# Patient Record
Sex: MALE | Race: ASIAN | Employment: FULL TIME | URBAN - METROPOLITAN AREA
[De-identification: names, ages, dates, MRNs, and addresses within clinical notes are randomized per-mention and may not be internally consistent; named-entity substitution may affect disease eponyms.]

---

## 2017-01-10 RX ORDER — FENOFIBRATE 48 MG/1
48 TABLET, COATED ORAL DAILY
Qty: 90 TABLET | Refills: 0 | Status: SHIPPED | OUTPATIENT
Start: 2017-01-10 | End: 2017-01-23

## 2017-01-10 NOTE — TELEPHONE ENCOUNTER
From: Rory Marie  To: Yady Gee DO  Sent: 1/9/2017 1:27 PM CST  Subject: Medication Renewal Request    Original authorizing provider: DO Jeanne Marquez would like a refill of the following medications:  Fenofibrate 48 MG Oral Tab Jose Lapine

## 2017-01-23 ENCOUNTER — TELEPHONE (OUTPATIENT)
Dept: FAMILY MEDICINE CLINIC | Facility: CLINIC | Age: 57
End: 2017-01-23

## 2017-01-23 RX ORDER — FENOFIBRATE 48 MG/1
48 TABLET, COATED ORAL DAILY
Qty: 90 TABLET | Refills: 0 | Status: SHIPPED | OUTPATIENT
Start: 2017-01-23 | End: 2017-07-31

## 2017-01-23 NOTE — TELEPHONE ENCOUNTER
Rohan Gonsales from [facility] called stated they sent us a couple faxes requesting a new prescription for fenofibrate, they did received one on the 10th already and was shipped to the pt but the package is delayed and they are not sure what happened to

## 2017-02-21 NOTE — TELEPHONE ENCOUNTER
From: Danielle Asif  To: Riana Tinoco DO  Sent: 2/20/2017 10:45 AM CST  Subject: Medication Renewal Request    Original authorizing provider: DO Jeanne Collins would like a refill of the following medications:  MetFORMIN HCl 1000 MG Oral Tab [J

## 2017-04-08 ENCOUNTER — LAB ENCOUNTER (OUTPATIENT)
Dept: LAB | Age: 57
End: 2017-04-08
Attending: FAMILY MEDICINE
Payer: COMMERCIAL

## 2017-04-08 DIAGNOSIS — Z00.00 ROUTINE GENERAL MEDICAL EXAMINATION AT A HEALTH CARE FACILITY: ICD-10-CM

## 2017-04-08 DIAGNOSIS — E11.9 TYPE II OR UNSPECIFIED TYPE DIABETES MELLITUS WITHOUT MENTION OF COMPLICATION, NOT STATED AS UNCONTROLLED: ICD-10-CM

## 2017-04-08 DIAGNOSIS — E11.9 TYPE 2 DIABETES MELLITUS WITHOUT COMPLICATION, WITHOUT LONG-TERM CURRENT USE OF INSULIN (HCC): ICD-10-CM

## 2017-04-08 PROCEDURE — 83036 HEMOGLOBIN GLYCOSYLATED A1C: CPT

## 2017-04-08 PROCEDURE — 36415 COLL VENOUS BLD VENIPUNCTURE: CPT

## 2017-04-08 PROCEDURE — 80061 LIPID PANEL: CPT

## 2017-04-08 PROCEDURE — 84443 ASSAY THYROID STIM HORMONE: CPT

## 2017-04-08 PROCEDURE — 82043 UR ALBUMIN QUANTITATIVE: CPT

## 2017-04-08 PROCEDURE — 85025 COMPLETE CBC W/AUTO DIFF WBC: CPT

## 2017-04-08 PROCEDURE — 82570 ASSAY OF URINE CREATININE: CPT

## 2017-04-08 PROCEDURE — 80053 COMPREHEN METABOLIC PANEL: CPT

## 2017-04-11 ENCOUNTER — TELEPHONE (OUTPATIENT)
Dept: FAMILY MEDICINE CLINIC | Facility: CLINIC | Age: 57
End: 2017-04-11

## 2017-04-17 ENCOUNTER — OFFICE VISIT (OUTPATIENT)
Dept: FAMILY MEDICINE CLINIC | Facility: CLINIC | Age: 57
End: 2017-04-17

## 2017-04-17 VITALS
TEMPERATURE: 98 F | RESPIRATION RATE: 20 BRPM | BODY MASS INDEX: 27 KG/M2 | SYSTOLIC BLOOD PRESSURE: 130 MMHG | WEIGHT: 180 LBS | HEART RATE: 78 BPM | OXYGEN SATURATION: 98 % | DIASTOLIC BLOOD PRESSURE: 82 MMHG

## 2017-04-17 DIAGNOSIS — R07.9 CHEST PAIN, UNSPECIFIED TYPE: Primary | ICD-10-CM

## 2017-04-17 DIAGNOSIS — E11.00 TYPE 2 DIABETES MELLITUS WITH HYPEROSMOLARITY WITHOUT COMA, WITHOUT LONG-TERM CURRENT USE OF INSULIN (HCC): ICD-10-CM

## 2017-04-17 PROCEDURE — 99213 OFFICE O/P EST LOW 20 MIN: CPT | Performed by: FAMILY MEDICINE

## 2017-04-17 NOTE — PROGRESS NOTES
Here with his wife who is also a diabetic review recent blood work. He is also been having some at rest or exertional midepigastric pain with belching is now basically gone. It was not related to exercise.     Vital signs are normal.  All medications were

## 2017-07-31 RX ORDER — FENOFIBRATE 48 MG/1
48 TABLET, COATED ORAL DAILY
Qty: 90 TABLET | Refills: 0 | Status: SHIPPED
Start: 2017-07-31 | End: 2017-11-09

## 2017-07-31 NOTE — TELEPHONE ENCOUNTER
From: Jeanne Uriostegui  Sent: 7/31/2017 9:27 AM CDT  Subject: Medication Renewal Request    Jeanne Arnold would like a refill of the following medications:  Fenofibrate 48 MG Oral Tab Fabiola Fears, DO]    Preferred pharmacy: [facility]

## 2017-11-10 RX ORDER — FENOFIBRATE 48 MG/1
48 TABLET, COATED ORAL DAILY
Qty: 90 TABLET | Refills: 0 | Status: SHIPPED
Start: 2017-11-10 | End: 2017-11-22

## 2017-11-10 NOTE — TELEPHONE ENCOUNTER
From: Jeanne Christie  Sent: 11/9/2017 7:05 AM CST  Subject: Medication Renewal Request    Jeanne Arnold would like a refill of the following medications:     TOR MICROLET LANCETS Does not apply Misc Gabi Deal DOJaylen    Preferred pharmacy: [address] [place] [address]

## 2017-11-10 NOTE — TELEPHONE ENCOUNTER
From: Jeanne Collazo  Sent: 11/9/2017 7:02 AM CST  Subject: Medication Renewal Request    Jeanne Arnold would like a refill of the following medications:     Fenofibrate 48 MG Oral Tab Gene Horvath DO]    Preferred pharmacy: [id number] [address] [facility]

## 2017-11-22 RX ORDER — FENOFIBRATE 48 MG/1
48 TABLET, COATED ORAL DAILY
Qty: 30 TABLET | Refills: 0 | Status: SHIPPED | OUTPATIENT
Start: 2017-11-22 | End: 2017-12-04

## 2017-11-25 ENCOUNTER — LAB ENCOUNTER (OUTPATIENT)
Dept: LAB | Age: 57
End: 2017-11-25
Attending: FAMILY MEDICINE
Payer: COMMERCIAL

## 2017-11-25 DIAGNOSIS — E11.00 TYPE 2 DIABETES MELLITUS WITH HYPEROSMOLARITY WITHOUT COMA, WITHOUT LONG-TERM CURRENT USE OF INSULIN (HCC): ICD-10-CM

## 2017-11-25 DIAGNOSIS — R07.9 CHEST PAIN, UNSPECIFIED TYPE: ICD-10-CM

## 2017-11-25 PROCEDURE — 85025 COMPLETE CBC W/AUTO DIFF WBC: CPT

## 2017-11-25 PROCEDURE — 80053 COMPREHEN METABOLIC PANEL: CPT

## 2017-11-25 PROCEDURE — 84443 ASSAY THYROID STIM HORMONE: CPT

## 2017-11-25 PROCEDURE — 80061 LIPID PANEL: CPT

## 2017-11-25 PROCEDURE — 36415 COLL VENOUS BLD VENIPUNCTURE: CPT

## 2017-11-30 NOTE — TELEPHONE ENCOUNTER
From: Jeanne Eason  Sent: 11/30/2017 7:33 AM CST  Subject: Medication Renewal Request    Jeanne Arnold would like a refill of the following medications:     MetFORMIN HCl 1000 MG Oral Tab Lyndsay Null DO]   Patient Comment: Please fill 3 Months supplies at [facility]-

## 2017-12-04 ENCOUNTER — OFFICE VISIT (OUTPATIENT)
Dept: FAMILY MEDICINE CLINIC | Facility: CLINIC | Age: 57
End: 2017-12-04

## 2017-12-04 VITALS
HEART RATE: 74 BPM | TEMPERATURE: 99 F | DIASTOLIC BLOOD PRESSURE: 80 MMHG | RESPIRATION RATE: 16 BRPM | BODY MASS INDEX: 26.82 KG/M2 | WEIGHT: 179 LBS | HEIGHT: 68.5 IN | SYSTOLIC BLOOD PRESSURE: 120 MMHG

## 2017-12-04 DIAGNOSIS — Z00.00 ROUTINE GENERAL MEDICAL EXAMINATION AT A HEALTH CARE FACILITY: Primary | ICD-10-CM

## 2017-12-04 DIAGNOSIS — E11.9 TYPE 2 DIABETES MELLITUS WITHOUT COMPLICATION, WITHOUT LONG-TERM CURRENT USE OF INSULIN (HCC): ICD-10-CM

## 2017-12-04 PROCEDURE — 99396 PREV VISIT EST AGE 40-64: CPT | Performed by: FAMILY MEDICINE

## 2017-12-04 RX ORDER — FENOFIBRATE 48 MG/1
48 TABLET, COATED ORAL DAILY
Qty: 30 TABLET | Refills: 0 | Status: SHIPPED | OUTPATIENT
Start: 2017-12-04 | End: 2018-02-22

## 2017-12-05 NOTE — PROGRESS NOTES
Here for a physical.Type 2 diabetic with off-and-on control. Primarily on metformin twice a day along with fenofibrate. He is a non-smoker nondrinker. Does not regularly exercise. Most recent hemoglobin A1c was 6.6.   He is behind on colonoscopy he does

## 2018-02-22 RX ORDER — FENOFIBRATE 48 MG/1
48 TABLET, COATED ORAL DAILY
Qty: 30 TABLET | Refills: 0 | Status: SHIPPED
Start: 2018-02-22 | End: 2018-02-27

## 2018-02-22 NOTE — TELEPHONE ENCOUNTER
From: Jeanne Orozco  Sent: 2/22/2018 12:11 PM CST  Subject: Medication Renewal Request    Jeanne Arnold would like a refill of the following medications:     Fenofibrate 48 MG Oral Tab Dilma Ahn DO]    Preferred pharmacy: [id number], [address] [facility] -

## 2018-02-27 RX ORDER — FENOFIBRATE 48 MG/1
48 TABLET, COATED ORAL DAILY
Qty: 90 TABLET | Refills: 0 | Status: SHIPPED | OUTPATIENT
Start: 2018-02-27 | End: 2018-05-30

## 2018-05-31 RX ORDER — FENOFIBRATE 48 MG/1
48 TABLET, COATED ORAL DAILY
Qty: 90 TABLET | Refills: 0 | Status: SHIPPED
Start: 2018-05-31 | End: 2018-09-10

## 2018-05-31 NOTE — TELEPHONE ENCOUNTER
From: Jeanne Pavon  Sent: 5/30/2018 12:23 PM CDT  Subject: Medication Renewal Request    Jeanne Arnold would like a refill of the following medications:     Fenofibrate 48 MG Oral Tab Garret Schilling DO]    Preferred pharmacy: [facility] -

## 2018-06-14 ENCOUNTER — PATIENT OUTREACH (OUTPATIENT)
Dept: FAMILY MEDICINE CLINIC | Facility: CLINIC | Age: 58
End: 2018-06-14

## 2018-08-03 ENCOUNTER — OFFICE VISIT (OUTPATIENT)
Dept: FAMILY MEDICINE CLINIC | Facility: CLINIC | Age: 58
End: 2018-08-03

## 2018-08-03 VITALS
BODY MASS INDEX: 27.12 KG/M2 | HEIGHT: 68.5 IN | OXYGEN SATURATION: 98 % | RESPIRATION RATE: 16 BRPM | SYSTOLIC BLOOD PRESSURE: 116 MMHG | HEART RATE: 84 BPM | DIASTOLIC BLOOD PRESSURE: 86 MMHG | WEIGHT: 181 LBS | TEMPERATURE: 98 F

## 2018-08-03 DIAGNOSIS — Z12.5 ENCOUNTER FOR SCREENING FOR MALIGNANT NEOPLASM OF PROSTATE: ICD-10-CM

## 2018-08-03 DIAGNOSIS — Z13.6 ENCOUNTER FOR LIPID SCREENING FOR CARDIOVASCULAR DISEASE: ICD-10-CM

## 2018-08-03 DIAGNOSIS — R25.1 TREMOR: ICD-10-CM

## 2018-08-03 DIAGNOSIS — Z13.220 ENCOUNTER FOR LIPID SCREENING FOR CARDIOVASCULAR DISEASE: ICD-10-CM

## 2018-08-03 DIAGNOSIS — Z13.29 SCREENING FOR THYROID DISORDER: ICD-10-CM

## 2018-08-03 DIAGNOSIS — R25.1 TREMOR OF BOTH HANDS: Primary | ICD-10-CM

## 2018-08-03 DIAGNOSIS — E55.9 HYPOVITAMINOSIS D: ICD-10-CM

## 2018-08-03 DIAGNOSIS — E11.9 CONTROLLED TYPE 2 DIABETES MELLITUS WITHOUT COMPLICATION, WITHOUT LONG-TERM CURRENT USE OF INSULIN (HCC): ICD-10-CM

## 2018-08-03 PROCEDURE — 99214 OFFICE O/P EST MOD 30 MIN: CPT | Performed by: FAMILY MEDICINE

## 2018-08-03 NOTE — PROGRESS NOTES
Presents today with increasing changes of his bilateral upper extremity tremors. They began well over 10 years ago. He is uncertain about other family members with the same problem. He is right-handed.   The tremors are somewhat coarse in nature they are

## 2018-08-14 NOTE — TELEPHONE ENCOUNTER
From: Jeanne Arnold  Sent: 8/14/2018 7:03 AM CDT  Subject: Medication Renewal Request    Jeanne Arnold would like a refill of the following medications:     MetFORMIN HCl 1000 MG Oral Tab Laureano Gruber DO]   Patient Comment: Please fill 3 months supplies    Prefe

## 2018-08-20 ENCOUNTER — HOSPITAL ENCOUNTER (OUTPATIENT)
Dept: MRI IMAGING | Age: 58
Discharge: HOME | End: 2018-08-20
Attending: FAMILY MEDICINE
Payer: COMMERCIAL

## 2018-08-20 DIAGNOSIS — R25.1 TREMOR: ICD-10-CM

## 2018-08-20 LAB — CREAT SERPL-MCNC: 1.2 MG/DL (ref 0.7–1.3)

## 2018-08-20 PROCEDURE — 82565 ASSAY OF CREATININE: CPT

## 2018-08-20 PROCEDURE — 72156 MRI NECK SPINE W/O & W/DYE: CPT | Performed by: FAMILY MEDICINE

## 2018-08-20 PROCEDURE — A9576 INJ PROHANCE MULTIPACK: HCPCS | Performed by: FAMILY MEDICINE

## 2018-08-20 PROCEDURE — 70553 MRI BRAIN STEM W/O & W/DYE: CPT | Performed by: FAMILY MEDICINE

## 2018-09-01 ENCOUNTER — LAB ENCOUNTER (OUTPATIENT)
Dept: LAB | Age: 58
End: 2018-09-01
Attending: FAMILY MEDICINE
Payer: COMMERCIAL

## 2018-09-01 DIAGNOSIS — Z13.220 ENCOUNTER FOR LIPID SCREENING FOR CARDIOVASCULAR DISEASE: ICD-10-CM

## 2018-09-01 DIAGNOSIS — Z12.5 ENCOUNTER FOR SCREENING FOR MALIGNANT NEOPLASM OF PROSTATE: ICD-10-CM

## 2018-09-01 DIAGNOSIS — E11.9 CONTROLLED TYPE 2 DIABETES MELLITUS WITHOUT COMPLICATION, WITHOUT LONG-TERM CURRENT USE OF INSULIN (HCC): ICD-10-CM

## 2018-09-01 DIAGNOSIS — Z13.29 SCREENING FOR THYROID DISORDER: ICD-10-CM

## 2018-09-01 DIAGNOSIS — E55.9 HYPOVITAMINOSIS D: ICD-10-CM

## 2018-09-01 DIAGNOSIS — Z13.6 ENCOUNTER FOR LIPID SCREENING FOR CARDIOVASCULAR DISEASE: ICD-10-CM

## 2018-09-01 LAB
ALBUMIN SERPL-MCNC: 4.2 G/DL (ref 3.5–4.8)
ALBUMIN/GLOB SERPL: 1.4 {RATIO} (ref 1–2)
ALP LIVER SERPL-CCNC: 78 U/L (ref 45–117)
ALT SERPL-CCNC: 78 U/L (ref 17–63)
ANION GAP SERPL CALC-SCNC: 11 MMOL/L (ref 0–18)
AST SERPL-CCNC: 43 U/L (ref 15–41)
BASOPHILS # BLD AUTO: 0.02 X10(3) UL (ref 0–0.1)
BASOPHILS NFR BLD AUTO: 0.5 %
BILIRUB SERPL-MCNC: 0.5 MG/DL (ref 0.1–2)
BUN BLD-MCNC: 13 MG/DL (ref 8–20)
BUN/CREAT SERPL: 9.9 (ref 10–20)
CALCIUM BLD-MCNC: 9.1 MG/DL (ref 8.3–10.3)
CHLORIDE SERPL-SCNC: 105 MMOL/L (ref 101–111)
CHOLEST SMN-MCNC: 131 MG/DL (ref ?–200)
CO2 SERPL-SCNC: 25 MMOL/L (ref 22–32)
COMPLEXED PSA SERPL-MCNC: 1.91 NG/ML (ref 0.01–4)
CREAT BLD-MCNC: 1.31 MG/DL (ref 0.7–1.3)
CREAT UR-SCNC: 112 MG/DL
EOSINOPHIL # BLD AUTO: 0.11 X10(3) UL (ref 0–0.3)
EOSINOPHIL NFR BLD AUTO: 2.8 %
ERYTHROCYTE [DISTWIDTH] IN BLOOD BY AUTOMATED COUNT: 12.8 % (ref 11.5–16)
EST. AVERAGE GLUCOSE BLD GHB EST-MCNC: 157 MG/DL (ref 68–126)
GLOBULIN PLAS-MCNC: 3.1 G/DL (ref 2.5–4)
GLUCOSE BLD-MCNC: 140 MG/DL (ref 70–99)
HBA1C MFR BLD HPLC: 7.1 % (ref ?–5.7)
HCT VFR BLD AUTO: 44.2 % (ref 37–53)
HDLC SERPL-MCNC: 38 MG/DL (ref 40–59)
HGB BLD-MCNC: 13.7 G/DL (ref 13–17)
IMMATURE GRANULOCYTE COUNT: 0.01 X10(3) UL (ref 0–1)
IMMATURE GRANULOCYTE RATIO %: 0.3 %
LDLC SERPL CALC-MCNC: 67 MG/DL (ref ?–100)
LYMPHOCYTES # BLD AUTO: 1.36 X10(3) UL (ref 0.9–4)
LYMPHOCYTES NFR BLD AUTO: 35 %
M PROTEIN MFR SERPL ELPH: 7.3 G/DL (ref 6.1–8.3)
MCH RBC QN AUTO: 28 PG (ref 27–33.2)
MCHC RBC AUTO-ENTMCNC: 31 G/DL (ref 31–37)
MCV RBC AUTO: 90.4 FL (ref 80–99)
MICROALBUMIN UR-MCNC: 0.77 MG/DL
MICROALBUMIN/CREAT 24H UR-RTO: 6.9 UG/MG (ref ?–30)
MONOCYTES # BLD AUTO: 0.39 X10(3) UL (ref 0.1–1)
MONOCYTES NFR BLD AUTO: 10 %
NEUTROPHIL ABS PRELIM: 2 X10 (3) UL (ref 1.3–6.7)
NEUTROPHILS # BLD AUTO: 2 X10(3) UL (ref 1.3–6.7)
NEUTROPHILS NFR BLD AUTO: 51.4 %
NONHDLC SERPL-MCNC: 93 MG/DL (ref ?–130)
OSMOLALITY SERPL CALC.SUM OF ELEC: 294 MOSM/KG (ref 275–295)
PLATELET # BLD AUTO: 255 10(3)UL (ref 150–450)
POTASSIUM SERPL-SCNC: 4.6 MMOL/L (ref 3.6–5.1)
RBC # BLD AUTO: 4.89 X10(6)UL (ref 4.3–5.7)
RED CELL DISTRIBUTION WIDTH-SD: 42.2 FL (ref 35.1–46.3)
SODIUM SERPL-SCNC: 141 MMOL/L (ref 136–144)
TRIGL SERPL-MCNC: 130 MG/DL (ref 30–149)
TSI SER-ACNC: 1.14 MIU/ML (ref 0.35–5.5)
VIT D+METAB SERPL-MCNC: 33.4 NG/ML (ref 30–100)
VLDLC SERPL CALC-MCNC: 26 MG/DL (ref 0–30)
WBC # BLD AUTO: 3.9 X10(3) UL (ref 4–13)

## 2018-09-01 PROCEDURE — 82570 ASSAY OF URINE CREATININE: CPT

## 2018-09-01 PROCEDURE — 82043 UR ALBUMIN QUANTITATIVE: CPT

## 2018-09-01 PROCEDURE — 83036 HEMOGLOBIN GLYCOSYLATED A1C: CPT

## 2018-09-01 PROCEDURE — 80061 LIPID PANEL: CPT

## 2018-09-01 PROCEDURE — 82306 VITAMIN D 25 HYDROXY: CPT

## 2018-09-01 PROCEDURE — 80053 COMPREHEN METABOLIC PANEL: CPT

## 2018-09-01 PROCEDURE — 36415 COLL VENOUS BLD VENIPUNCTURE: CPT

## 2018-09-01 PROCEDURE — 84443 ASSAY THYROID STIM HORMONE: CPT

## 2018-09-01 PROCEDURE — 85025 COMPLETE CBC W/AUTO DIFF WBC: CPT

## 2018-09-10 RX ORDER — FENOFIBRATE 48 MG/1
48 TABLET, COATED ORAL DAILY
Qty: 90 TABLET | Refills: 0 | Status: SHIPPED
Start: 2018-09-10 | End: 2018-12-11

## 2018-09-11 ENCOUNTER — OFFICE VISIT (OUTPATIENT)
Dept: FAMILY MEDICINE CLINIC | Facility: CLINIC | Age: 58
End: 2018-09-11

## 2018-09-11 VITALS
SYSTOLIC BLOOD PRESSURE: 112 MMHG | OXYGEN SATURATION: 98 % | DIASTOLIC BLOOD PRESSURE: 74 MMHG | TEMPERATURE: 98 F | WEIGHT: 181 LBS | RESPIRATION RATE: 17 BRPM | HEIGHT: 68.5 IN | BODY MASS INDEX: 27.12 KG/M2 | HEART RATE: 66 BPM

## 2018-09-11 DIAGNOSIS — E11.9 CONTROLLED TYPE 2 DIABETES MELLITUS WITHOUT COMPLICATION, WITHOUT LONG-TERM CURRENT USE OF INSULIN (HCC): Primary | ICD-10-CM

## 2018-09-11 PROCEDURE — 99213 OFFICE O/P EST LOW 20 MIN: CPT | Performed by: FAMILY MEDICINE

## 2018-09-11 NOTE — PROGRESS NOTES
Still having tremors in the hands and some paresthesias the MR scan confirmed discogenic sources in the cervical spine    We reviewed his blood work. His A1c level is 7.1. He still has very mild elevation of his AST and ALT compatible with fatty liver.

## 2018-09-25 ENCOUNTER — DOCUMENTATION ONLY (OUTPATIENT)
Dept: FAMILY MEDICINE CLINIC | Facility: CLINIC | Age: 58
End: 2018-09-25

## 2018-09-27 ENCOUNTER — TELEPHONE (OUTPATIENT)
Dept: FAMILY MEDICINE CLINIC | Facility: CLINIC | Age: 58
End: 2018-09-27

## 2018-11-17 ENCOUNTER — OFFICE VISIT (OUTPATIENT)
Dept: ENDOCRINOLOGY CLINIC | Facility: CLINIC | Age: 58
End: 2018-11-17

## 2018-11-17 VITALS
WEIGHT: 180 LBS | OXYGEN SATURATION: 99 % | SYSTOLIC BLOOD PRESSURE: 118 MMHG | RESPIRATION RATE: 16 BRPM | DIASTOLIC BLOOD PRESSURE: 70 MMHG | HEART RATE: 87 BPM | BODY MASS INDEX: 26.97 KG/M2 | HEIGHT: 68.5 IN

## 2018-11-17 DIAGNOSIS — E11.9 TYPE 2 DIABETES MELLITUS WITHOUT COMPLICATION, UNSPECIFIED WHETHER LONG TERM INSULIN USE (HCC): Primary | ICD-10-CM

## 2018-11-17 PROCEDURE — 99214 OFFICE O/P EST MOD 30 MIN: CPT | Performed by: NURSE PRACTITIONER

## 2018-11-17 NOTE — PATIENT INSTRUCTIONS
We are here to support you with Diabetes but please remember that you still need your primary care doctor for your routine health maintenance. Your A1C: 7.1%  This test provides us with your average blood sugar for the past 3 months.    The main goal of d is important to monitor your kidney function (blood and urine protein levels) , liver function tests and cholesterol levels when you have diabetes. If your primary care provider has not ordered these tests, I will order them for you.    2. FOOT exams:  dona

## 2018-11-17 NOTE — PROGRESS NOTES
HPI:   Jaimie Good is a 63year old male who presents for recheck of his type 2 diabetes. Patient states that he didn't check his blood glucose regularly until the last couple of days and has been checking in the morning fasting.   He denies missing medication wrist, unspecified site. His family history includes Heart Attack in his father. He  reports that  has never smoked. he has never used smokeless tobacco. He reports that he drinks alcohol. He reports that he does not use drugs.      He has No Known Ranulfo denies abdominal pain and denies heartburn  NEURO: denies headaches and denies numbness and tingling to extremities, positive for bilateral hand tremors    EXAM:   /70   Pulse 87   Resp 16   Ht 68.5\"   Wt 180 lb   SpO2 99%   BMI 26.97 kg/m²  Estimat monitoring blood glucose and the importance of monitoring. Patient agreed to monitoring bid - fasting in AM and 2 hours postprandial of one meal per day.     Patient agreed to increase self monitoring blood glucose but declined adding medications to regime

## 2018-11-24 RX ORDER — BLOOD SUGAR DIAGNOSTIC
STRIP MISCELLANEOUS
Qty: 100 STRIP | Refills: 0 | Status: SHIPPED | OUTPATIENT
Start: 2018-11-24 | End: 2019-03-11

## 2018-12-11 RX ORDER — FENOFIBRATE 48 MG/1
TABLET, COATED ORAL
Qty: 90 TABLET | Refills: 0 | Status: SHIPPED | OUTPATIENT
Start: 2018-12-11 | End: 2018-12-20

## 2018-12-20 RX ORDER — FENOFIBRATE 48 MG/1
48 TABLET, COATED ORAL
Qty: 90 TABLET | Refills: 0 | Status: SHIPPED | OUTPATIENT
Start: 2018-12-20 | End: 2019-03-11

## 2019-03-11 RX ORDER — BLOOD-GLUCOSE METER
1 EACH MISCELLANEOUS 2 TIMES DAILY
Qty: 1 KIT | Refills: 0 | Status: SHIPPED | OUTPATIENT
Start: 2019-03-11 | End: 2020-03-10

## 2019-03-11 RX ORDER — LANCETS
EACH MISCELLANEOUS
Qty: 100 EACH | Refills: 3 | Status: SHIPPED | OUTPATIENT
Start: 2019-03-11

## 2019-03-11 RX ORDER — BLOOD SUGAR DIAGNOSTIC
STRIP MISCELLANEOUS
Qty: 100 STRIP | Refills: 3 | Status: SHIPPED | OUTPATIENT
Start: 2019-03-11 | End: 2020-03-10

## 2019-03-11 RX ORDER — FENOFIBRATE 48 MG/1
48 TABLET, COATED ORAL
Qty: 90 TABLET | Refills: 3 | Status: SHIPPED | OUTPATIENT
Start: 2019-03-11 | End: 2020-02-04

## 2019-08-09 ENCOUNTER — TELEPHONE (OUTPATIENT)
Dept: FAMILY MEDICINE CLINIC | Facility: CLINIC | Age: 59
End: 2019-08-09

## 2019-08-09 NOTE — TELEPHONE ENCOUNTER
.Pt is due for a f/u on blood pressure medication.  Please call to schedule one    No further refills will be made without an appointment

## 2019-08-12 ENCOUNTER — OFFICE VISIT (OUTPATIENT)
Dept: FAMILY MEDICINE CLINIC | Facility: CLINIC | Age: 59
End: 2019-08-12

## 2019-08-12 VITALS
RESPIRATION RATE: 16 BRPM | HEART RATE: 62 BPM | DIASTOLIC BLOOD PRESSURE: 82 MMHG | HEIGHT: 68.5 IN | TEMPERATURE: 99 F | SYSTOLIC BLOOD PRESSURE: 118 MMHG | OXYGEN SATURATION: 98 % | BODY MASS INDEX: 26.82 KG/M2 | WEIGHT: 179 LBS

## 2019-08-12 DIAGNOSIS — Z12.11 SCREENING FOR COLON CANCER: Primary | ICD-10-CM

## 2019-08-12 DIAGNOSIS — E11.9 TYPE 2 DIABETES MELLITUS WITHOUT COMPLICATION, UNSPECIFIED WHETHER LONG TERM INSULIN USE (HCC): ICD-10-CM

## 2019-08-12 PROCEDURE — 99214 OFFICE O/P EST MOD 30 MIN: CPT | Performed by: FAMILY MEDICINE

## 2019-08-16 NOTE — PROGRESS NOTES
2 diabetic with overall lackluster control. Here to review blood work. Today's exam vital signs unremarkable    Diabetic foot exam unremarkable.     The assessment type 2 diabetes inadequately controlled    The plan diet exercise lifestyle update blood wo

## 2019-09-21 ENCOUNTER — LAB ENCOUNTER (OUTPATIENT)
Dept: LAB | Age: 59
End: 2019-09-21
Attending: FAMILY MEDICINE
Payer: COMMERCIAL

## 2019-09-21 DIAGNOSIS — E11.9 TYPE 2 DIABETES MELLITUS WITHOUT COMPLICATION, UNSPECIFIED WHETHER LONG TERM INSULIN USE (HCC): ICD-10-CM

## 2019-09-21 LAB
ALBUMIN SERPL-MCNC: 4.2 G/DL (ref 3.4–5)
ALBUMIN/GLOB SERPL: 1.3 {RATIO} (ref 1–2)
ALP LIVER SERPL-CCNC: 79 U/L (ref 45–117)
ALT SERPL-CCNC: 75 U/L (ref 16–61)
ANION GAP SERPL CALC-SCNC: 5 MMOL/L (ref 0–18)
AST SERPL-CCNC: 42 U/L (ref 15–37)
BASOPHILS # BLD AUTO: 0.01 X10(3) UL (ref 0–0.2)
BASOPHILS NFR BLD AUTO: 0.3 %
BILIRUB SERPL-MCNC: 0.7 MG/DL (ref 0.1–2)
BUN BLD-MCNC: 16 MG/DL (ref 7–18)
BUN/CREAT SERPL: 11.1 (ref 10–20)
CALCIUM BLD-MCNC: 9.2 MG/DL (ref 8.5–10.1)
CHLORIDE SERPL-SCNC: 107 MMOL/L (ref 98–112)
CHOLEST SMN-MCNC: 149 MG/DL (ref ?–200)
CO2 SERPL-SCNC: 29 MMOL/L (ref 21–32)
COMPLEXED PSA SERPL-MCNC: 1.86 NG/ML (ref ?–4)
CREAT BLD-MCNC: 1.44 MG/DL (ref 0.7–1.3)
DEPRECATED RDW RBC AUTO: 42.5 FL (ref 35.1–46.3)
EOSINOPHIL # BLD AUTO: 0.17 X10(3) UL (ref 0–0.7)
EOSINOPHIL NFR BLD AUTO: 4.6 %
ERYTHROCYTE [DISTWIDTH] IN BLOOD BY AUTOMATED COUNT: 13.1 % (ref 11–15)
EST. AVERAGE GLUCOSE BLD GHB EST-MCNC: 160 MG/DL (ref 68–126)
GLOBULIN PLAS-MCNC: 3.3 G/DL (ref 2.8–4.4)
GLUCOSE BLD-MCNC: 153 MG/DL (ref 70–99)
HBA1C MFR BLD HPLC: 7.2 % (ref ?–5.7)
HCT VFR BLD AUTO: 41.1 % (ref 39–53)
HDLC SERPL-MCNC: 37 MG/DL (ref 40–59)
HGB BLD-MCNC: 13.7 G/DL (ref 13–17.5)
IMM GRANULOCYTES # BLD AUTO: 0.01 X10(3) UL (ref 0–1)
IMM GRANULOCYTES NFR BLD: 0.3 %
LDLC SERPL CALC-MCNC: 95 MG/DL (ref ?–100)
LYMPHOCYTES # BLD AUTO: 1.11 X10(3) UL (ref 1–4)
LYMPHOCYTES NFR BLD AUTO: 30 %
M PROTEIN MFR SERPL ELPH: 7.5 G/DL (ref 6.4–8.2)
MCH RBC QN AUTO: 29.8 PG (ref 26–34)
MCHC RBC AUTO-ENTMCNC: 33.3 G/DL (ref 31–37)
MCV RBC AUTO: 89.3 FL (ref 80–100)
MONOCYTES # BLD AUTO: 0.34 X10(3) UL (ref 0.1–1)
MONOCYTES NFR BLD AUTO: 9.2 %
NEUTROPHILS # BLD AUTO: 2.06 X10 (3) UL (ref 1.5–7.7)
NEUTROPHILS # BLD AUTO: 2.06 X10(3) UL (ref 1.5–7.7)
NEUTROPHILS NFR BLD AUTO: 55.6 %
NONHDLC SERPL-MCNC: 112 MG/DL (ref ?–130)
OSMOLALITY SERPL CALC.SUM OF ELEC: 296 MOSM/KG (ref 275–295)
PLATELET # BLD AUTO: 229 10(3)UL (ref 150–450)
POTASSIUM SERPL-SCNC: 4.6 MMOL/L (ref 3.5–5.1)
RBC # BLD AUTO: 4.6 X10(6)UL (ref 4.3–5.7)
SODIUM SERPL-SCNC: 141 MMOL/L (ref 136–145)
TRIGL SERPL-MCNC: 85 MG/DL (ref 30–149)
TSI SER-ACNC: 0.94 MIU/ML (ref 0.36–3.74)
VIT D+METAB SERPL-MCNC: 43.2 NG/ML (ref 30–100)
VLDLC SERPL CALC-MCNC: 17 MG/DL (ref 0–30)
WBC # BLD AUTO: 3.7 X10(3) UL (ref 4–11)

## 2019-09-21 PROCEDURE — 82306 VITAMIN D 25 HYDROXY: CPT

## 2019-09-21 PROCEDURE — 80053 COMPREHEN METABOLIC PANEL: CPT

## 2019-09-21 PROCEDURE — 83036 HEMOGLOBIN GLYCOSYLATED A1C: CPT

## 2019-09-21 PROCEDURE — 36415 COLL VENOUS BLD VENIPUNCTURE: CPT

## 2019-09-21 PROCEDURE — 85025 COMPLETE CBC W/AUTO DIFF WBC: CPT

## 2019-09-21 PROCEDURE — 84443 ASSAY THYROID STIM HORMONE: CPT

## 2019-09-21 PROCEDURE — 80061 LIPID PANEL: CPT

## 2019-09-30 ENCOUNTER — TELEPHONE (OUTPATIENT)
Dept: FAMILY MEDICINE CLINIC | Facility: CLINIC | Age: 59
End: 2019-09-30

## 2019-11-03 DIAGNOSIS — E11.9 TYPE 2 DIABETES MELLITUS WITHOUT COMPLICATION, WITHOUT LONG-TERM CURRENT USE OF INSULIN (HCC): Primary | ICD-10-CM

## 2019-11-30 ENCOUNTER — OFFICE VISIT (OUTPATIENT)
Dept: FAMILY MEDICINE CLINIC | Facility: CLINIC | Age: 59
End: 2019-11-30

## 2019-11-30 VITALS
DIASTOLIC BLOOD PRESSURE: 74 MMHG | BODY MASS INDEX: 26.22 KG/M2 | OXYGEN SATURATION: 98 % | TEMPERATURE: 98 F | HEIGHT: 68.5 IN | SYSTOLIC BLOOD PRESSURE: 126 MMHG | HEART RATE: 66 BPM | RESPIRATION RATE: 18 BRPM | WEIGHT: 175 LBS

## 2019-11-30 DIAGNOSIS — E11.9 DIABETES MELLITUS WITHOUT COMPLICATION (HCC): ICD-10-CM

## 2019-11-30 DIAGNOSIS — Z00.00 WELL ADULT EXAM: Primary | ICD-10-CM

## 2019-11-30 PROCEDURE — 99396 PREV VISIT EST AGE 40-64: CPT | Performed by: FAMILY MEDICINE

## 2019-11-30 NOTE — PROGRESS NOTES
Patient is a type II diabetic here for follow-up he is without symptoms a full diabetic foot exam was performed. Bilateral barefoot skin diabetic exam is normal, visualized feet and the appearance is normal.  Bilateral monofilament/sensation of both feet is

## 2020-02-04 RX ORDER — FENOFIBRATE 48 MG/1
TABLET, COATED ORAL
Qty: 90 TABLET | Refills: 3 | Status: SHIPPED | OUTPATIENT
Start: 2020-02-04 | End: 2021-03-01

## 2020-12-24 ENCOUNTER — LAB ENCOUNTER (OUTPATIENT)
Dept: LAB | Age: 60
End: 2020-12-24
Attending: FAMILY MEDICINE
Payer: COMMERCIAL

## 2020-12-24 DIAGNOSIS — E55.9 HYPOVITAMINOSIS D: ICD-10-CM

## 2020-12-24 DIAGNOSIS — E11.9 TYPE 2 DIABETES MELLITUS WITHOUT COMPLICATION, WITHOUT LONG-TERM CURRENT USE OF INSULIN (HCC): ICD-10-CM

## 2020-12-24 DIAGNOSIS — Z12.5 SCREENING FOR MALIGNANT NEOPLASM OF PROSTATE: ICD-10-CM

## 2020-12-24 DIAGNOSIS — Z00.00 LABORATORY EXAM ORDERED AS PART OF ROUTINE GENERAL MEDICAL EXAMINATION: ICD-10-CM

## 2020-12-24 PROCEDURE — 84443 ASSAY THYROID STIM HORMONE: CPT

## 2020-12-24 PROCEDURE — 80061 LIPID PANEL: CPT

## 2020-12-24 PROCEDURE — 80053 COMPREHEN METABOLIC PANEL: CPT

## 2020-12-24 PROCEDURE — 82043 UR ALBUMIN QUANTITATIVE: CPT

## 2020-12-24 PROCEDURE — 83036 HEMOGLOBIN GLYCOSYLATED A1C: CPT

## 2020-12-24 PROCEDURE — 82570 ASSAY OF URINE CREATININE: CPT

## 2020-12-24 PROCEDURE — 82306 VITAMIN D 25 HYDROXY: CPT

## 2020-12-24 PROCEDURE — 36415 COLL VENOUS BLD VENIPUNCTURE: CPT

## 2020-12-24 PROCEDURE — 85025 COMPLETE CBC W/AUTO DIFF WBC: CPT

## 2020-12-31 ENCOUNTER — OFFICE VISIT (OUTPATIENT)
Dept: FAMILY MEDICINE CLINIC | Facility: CLINIC | Age: 60
End: 2020-12-31

## 2020-12-31 VITALS
HEIGHT: 68.5 IN | HEART RATE: 78 BPM | SYSTOLIC BLOOD PRESSURE: 118 MMHG | BODY MASS INDEX: 25.47 KG/M2 | RESPIRATION RATE: 16 BRPM | WEIGHT: 170 LBS | OXYGEN SATURATION: 98 % | DIASTOLIC BLOOD PRESSURE: 82 MMHG

## 2020-12-31 DIAGNOSIS — E11.9 TYPE 2 DIABETES MELLITUS WITHOUT COMPLICATION, WITHOUT LONG-TERM CURRENT USE OF INSULIN (HCC): ICD-10-CM

## 2020-12-31 DIAGNOSIS — Z00.00 WELL ADULT EXAM: Primary | ICD-10-CM

## 2020-12-31 DIAGNOSIS — Z86.010 HX OF COLONIC POLYPS: ICD-10-CM

## 2020-12-31 DIAGNOSIS — Z12.11 ENCOUNTER FOR SCREENING COLONOSCOPY: ICD-10-CM

## 2020-12-31 DIAGNOSIS — H93.13 TINNITUS OF BOTH EARS: ICD-10-CM

## 2020-12-31 DIAGNOSIS — E78.1 HYPERTRIGLYCERIDEMIA: ICD-10-CM

## 2020-12-31 PROCEDURE — 3079F DIAST BP 80-89 MM HG: CPT | Performed by: FAMILY MEDICINE

## 2020-12-31 PROCEDURE — 3074F SYST BP LT 130 MM HG: CPT | Performed by: FAMILY MEDICINE

## 2020-12-31 PROCEDURE — 99396 PREV VISIT EST AGE 40-64: CPT | Performed by: FAMILY MEDICINE

## 2020-12-31 PROCEDURE — 3008F BODY MASS INDEX DOCD: CPT | Performed by: FAMILY MEDICINE

## 2020-12-31 NOTE — PROGRESS NOTES
Patient is here for wellness exam.  Type II diabetic with generally good control and a recent A1c level of 6.7. No other complaints. He is due for shingles vaccine next year and a repeat colonoscopy for history of colon also next year.   Is up-to-date on

## 2021-03-01 RX ORDER — FENOFIBRATE 48 MG/1
48 TABLET, COATED ORAL DAILY
Qty: 90 TABLET | Refills: 3 | Status: SHIPPED | OUTPATIENT
Start: 2021-03-01 | End: 2022-01-04

## 2021-03-01 NOTE — TELEPHONE ENCOUNTER
Last refill:  FENOFIBRATE 48 MG Oral Tab 90 tablet 3 2/4/2020    Sig:   TAKE 1 TABLET ONCE DAILY       Last lipid: 12/24/2020. Last OV: 12/31/2020.   No future appt  Approve/deny:    ([id number] patient)

## 2021-04-15 NOTE — TELEPHONE ENCOUNTER
Last ov 12/31/2020  Last refill 12/14/2020    .   Diabetes:    Lab Results   Component Value Date    A1C 6.7 (H) 12/24/2020    A1C 7.2 (H) 09/21/2019    A1C 7.1 (H) 09/01/2018     (H) 12/24/2020     (H) 09/21/2019     (H) 09/01/2018

## 2021-10-25 NOTE — TELEPHONE ENCOUNTER
Will refill, ideally would recommend visits every 6 months. But at this point can likely wait for physical and then try to be seen every 6 months going forward.     Keturah Willoughby MD

## 2021-12-10 ENCOUNTER — PATIENT MESSAGE (OUTPATIENT)
Dept: FAMILY MEDICINE CLINIC | Facility: CLINIC | Age: 61
End: 2021-12-10

## 2021-12-10 DIAGNOSIS — Z12.5 SCREENING FOR MALIGNANT NEOPLASM OF PROSTATE: ICD-10-CM

## 2021-12-10 DIAGNOSIS — E78.1 HYPERTRIGLYCERIDEMIA: ICD-10-CM

## 2021-12-10 DIAGNOSIS — Z00.00 LABORATORY EXAM ORDERED AS PART OF ROUTINE GENERAL MEDICAL EXAMINATION: Primary | ICD-10-CM

## 2021-12-10 DIAGNOSIS — E11.9 TYPE 2 DIABETES MELLITUS WITHOUT COMPLICATION, WITHOUT LONG-TERM CURRENT USE OF INSULIN (HCC): ICD-10-CM

## 2021-12-10 NOTE — TELEPHONE ENCOUNTER
From: Marva Umanzor  To: Jose Neil MD  Sent: 12/10/2021 12:38 PM CST  Subject: Lab Work    Dr. Mae Moss,  I have a physical appointment with you on 01/04/2022.  Is it possible that you can order blood lab works for me before my appointment so we can discuss th

## 2021-12-17 ENCOUNTER — LAB ENCOUNTER (OUTPATIENT)
Dept: LAB | Age: 61
End: 2021-12-17
Attending: FAMILY MEDICINE
Payer: COMMERCIAL

## 2021-12-17 DIAGNOSIS — E78.1 HYPERTRIGLYCERIDEMIA: ICD-10-CM

## 2021-12-17 DIAGNOSIS — E11.9 TYPE 2 DIABETES MELLITUS WITHOUT COMPLICATION, WITHOUT LONG-TERM CURRENT USE OF INSULIN (HCC): ICD-10-CM

## 2021-12-17 DIAGNOSIS — Z00.00 LABORATORY EXAM ORDERED AS PART OF ROUTINE GENERAL MEDICAL EXAMINATION: ICD-10-CM

## 2021-12-17 DIAGNOSIS — Z12.5 SCREENING FOR MALIGNANT NEOPLASM OF PROSTATE: ICD-10-CM

## 2021-12-17 PROCEDURE — 83036 HEMOGLOBIN GLYCOSYLATED A1C: CPT

## 2021-12-17 PROCEDURE — 82043 UR ALBUMIN QUANTITATIVE: CPT

## 2021-12-17 PROCEDURE — 84443 ASSAY THYROID STIM HORMONE: CPT

## 2021-12-17 PROCEDURE — 85025 COMPLETE CBC W/AUTO DIFF WBC: CPT

## 2021-12-17 PROCEDURE — 80061 LIPID PANEL: CPT

## 2021-12-17 PROCEDURE — 36415 COLL VENOUS BLD VENIPUNCTURE: CPT

## 2021-12-17 PROCEDURE — 82570 ASSAY OF URINE CREATININE: CPT

## 2021-12-17 PROCEDURE — 80053 COMPREHEN METABOLIC PANEL: CPT

## 2022-01-04 ENCOUNTER — OFFICE VISIT (OUTPATIENT)
Dept: FAMILY MEDICINE CLINIC | Facility: CLINIC | Age: 62
End: 2022-01-04
Payer: COMMERCIAL

## 2022-01-04 VITALS
WEIGHT: 173 LBS | RESPIRATION RATE: 18 BRPM | DIASTOLIC BLOOD PRESSURE: 86 MMHG | HEART RATE: 58 BPM | BODY MASS INDEX: 25.92 KG/M2 | SYSTOLIC BLOOD PRESSURE: 128 MMHG | OXYGEN SATURATION: 98 % | HEIGHT: 68.5 IN

## 2022-01-04 DIAGNOSIS — Z12.11 SCREENING FOR COLORECTAL CANCER: ICD-10-CM

## 2022-01-04 DIAGNOSIS — E11.9 TYPE 2 DIABETES MELLITUS WITHOUT COMPLICATION, WITHOUT LONG-TERM CURRENT USE OF INSULIN (HCC): ICD-10-CM

## 2022-01-04 DIAGNOSIS — Z12.12 SCREENING FOR COLORECTAL CANCER: ICD-10-CM

## 2022-01-04 DIAGNOSIS — G25.0 ESSENTIAL TREMOR: ICD-10-CM

## 2022-01-04 DIAGNOSIS — Z00.00 WELLNESS EXAMINATION: Primary | ICD-10-CM

## 2022-01-04 DIAGNOSIS — E78.1 HYPERTRIGLYCERIDEMIA: ICD-10-CM

## 2022-01-04 PROCEDURE — 3079F DIAST BP 80-89 MM HG: CPT | Performed by: FAMILY MEDICINE

## 2022-01-04 PROCEDURE — 3074F SYST BP LT 130 MM HG: CPT | Performed by: FAMILY MEDICINE

## 2022-01-04 PROCEDURE — 3008F BODY MASS INDEX DOCD: CPT | Performed by: FAMILY MEDICINE

## 2022-01-04 PROCEDURE — 99396 PREV VISIT EST AGE 40-64: CPT | Performed by: FAMILY MEDICINE

## 2022-01-04 RX ORDER — PROPRANOLOL HYDROCHLORIDE 60 MG/1
60 TABLET ORAL 3 TIMES DAILY
COMMUNITY
End: 2022-01-04

## 2022-01-04 RX ORDER — ATORVASTATIN CALCIUM 20 MG/1
20 TABLET, FILM COATED ORAL NIGHTLY
Qty: 90 TABLET | Refills: 1 | Status: SHIPPED | OUTPATIENT
Start: 2022-01-04 | End: 2022-02-07

## 2022-01-04 RX ORDER — PROPRANOLOL HCL 60 MG
1 CAPSULE, EXTENDED RELEASE 24HR ORAL DAILY
Qty: 90 CAPSULE | Refills: 3 | Status: SHIPPED | OUTPATIENT
Start: 2022-01-04 | End: 2022-02-07

## 2022-01-04 NOTE — PROGRESS NOTES
HPI:   Bucky Goodwin is a 62year old male who presents for an Annual Health Visit. Here for a physical. No major changes in health or life. No troubles with medications.      Takes propranolol for essential tremor and noted some possible elevated bloo Constitutional: negative  Eyes: negative  ENT: negative  Respiratory: negative  Cardiovascular: negative  Gastrointestinal: negative  Integument/Breast: rash in groin area, many year, and talked to María before, comes and goes. WIll itch.  Worse in summe physical.    Diagnoses and all orders for this visit:    Wellness examination    Hypertriglyceridemia  -     atorvastatin 20 MG Oral Tab; Take 1 tablet (20 mg total) by mouth nightly.     Type 2 diabetes mellitus without complication, without long-term curr

## 2022-02-07 RX ORDER — PROPRANOLOL HCL 60 MG
1 CAPSULE, EXTENDED RELEASE 24HR ORAL DAILY
Qty: 90 CAPSULE | Refills: 3 | Status: SHIPPED | OUTPATIENT
Start: 2022-02-07 | End: 2023-02-02

## 2022-02-07 RX ORDER — ATORVASTATIN CALCIUM 20 MG/1
20 TABLET, FILM COATED ORAL NIGHTLY
Qty: 90 TABLET | Refills: 3 | Status: SHIPPED | OUTPATIENT
Start: 2022-02-07

## 2022-05-11 ENCOUNTER — TELEPHONE (OUTPATIENT)
Dept: FAMILY MEDICINE CLINIC | Facility: CLINIC | Age: 62
End: 2022-05-11

## 2022-07-24 ENCOUNTER — HOSPITAL ENCOUNTER (OUTPATIENT)
Age: 62
Discharge: HOME | End: 2022-07-24
Payer: COMMERCIAL

## 2022-07-24 VITALS
DIASTOLIC BLOOD PRESSURE: 79 MMHG | RESPIRATION RATE: 20 BRPM | HEIGHT: 69 IN | HEART RATE: 66 BPM | SYSTOLIC BLOOD PRESSURE: 133 MMHG | WEIGHT: 170 LBS | OXYGEN SATURATION: 98 % | BODY MASS INDEX: 25.18 KG/M2 | TEMPERATURE: 98 F

## 2022-07-24 DIAGNOSIS — L28.2 PRURITIC RASH: Primary | ICD-10-CM

## 2022-07-24 PROCEDURE — 99213 OFFICE O/P EST LOW 20 MIN: CPT

## 2022-07-24 PROCEDURE — 99203 OFFICE O/P NEW LOW 30 MIN: CPT

## 2022-07-24 RX ORDER — VALACYCLOVIR HYDROCHLORIDE 1 G/1
1000 TABLET, FILM COATED ORAL 3 TIMES DAILY
Qty: 21 TABLET | Refills: 0 | Status: SHIPPED | OUTPATIENT
Start: 2022-07-24 | End: 2022-07-31

## 2022-07-24 RX ORDER — KETOCONAZOLE 20 MG/G
CREAM TOPICAL
Qty: 30 G | Refills: 0 | Status: SHIPPED | OUTPATIENT
Start: 2022-07-24

## 2022-07-24 NOTE — ED INITIAL ASSESSMENT (HPI)
Rash  Right side of neck x 3-4 weeks. C/o itching. Pt used chinese ointment. Per pt rash started as a small round rash now  Bigger in size. Denies fever or pain.

## 2023-01-07 DIAGNOSIS — E78.1 HYPERTRIGLYCERIDEMIA: ICD-10-CM

## 2023-01-09 RX ORDER — ATORVASTATIN CALCIUM 20 MG/1
TABLET, FILM COATED ORAL
Qty: 90 TABLET | Refills: 3 | Status: SHIPPED | OUTPATIENT
Start: 2023-01-09

## 2023-01-16 ENCOUNTER — OFFICE VISIT (OUTPATIENT)
Dept: FAMILY MEDICINE CLINIC | Facility: CLINIC | Age: 63
End: 2023-01-16
Payer: COMMERCIAL

## 2023-01-16 VITALS
DIASTOLIC BLOOD PRESSURE: 80 MMHG | HEIGHT: 69 IN | BODY MASS INDEX: 26.36 KG/M2 | OXYGEN SATURATION: 98 % | HEART RATE: 69 BPM | TEMPERATURE: 98 F | SYSTOLIC BLOOD PRESSURE: 128 MMHG | WEIGHT: 178 LBS | RESPIRATION RATE: 16 BRPM

## 2023-01-16 DIAGNOSIS — G25.0 ESSENTIAL TREMOR: ICD-10-CM

## 2023-01-16 DIAGNOSIS — Z23 NEED FOR SHINGLES VACCINE: ICD-10-CM

## 2023-01-16 DIAGNOSIS — Z13.0 SCREENING FOR DEFICIENCY ANEMIA: ICD-10-CM

## 2023-01-16 DIAGNOSIS — Z12.12 SCREENING FOR COLORECTAL CANCER: ICD-10-CM

## 2023-01-16 DIAGNOSIS — E11.9 TYPE 2 DIABETES MELLITUS WITHOUT COMPLICATION, WITHOUT LONG-TERM CURRENT USE OF INSULIN (HCC): ICD-10-CM

## 2023-01-16 DIAGNOSIS — Z23 NEED FOR TETANUS BOOSTER: ICD-10-CM

## 2023-01-16 DIAGNOSIS — Z00.00 WELLNESS EXAMINATION: Primary | ICD-10-CM

## 2023-01-16 DIAGNOSIS — B35.1 ONYCHOMYCOSIS: ICD-10-CM

## 2023-01-16 DIAGNOSIS — Z13.220 LIPID SCREENING: ICD-10-CM

## 2023-01-16 DIAGNOSIS — Z12.5 PROSTATE CANCER SCREENING: ICD-10-CM

## 2023-01-16 DIAGNOSIS — Z12.11 SCREENING FOR COLORECTAL CANCER: ICD-10-CM

## 2023-01-16 PROCEDURE — 90750 HZV VACC RECOMBINANT IM: CPT | Performed by: FAMILY MEDICINE

## 2023-01-16 PROCEDURE — 3074F SYST BP LT 130 MM HG: CPT | Performed by: FAMILY MEDICINE

## 2023-01-16 PROCEDURE — 3008F BODY MASS INDEX DOCD: CPT | Performed by: FAMILY MEDICINE

## 2023-01-16 PROCEDURE — 90715 TDAP VACCINE 7 YRS/> IM: CPT | Performed by: FAMILY MEDICINE

## 2023-01-16 PROCEDURE — 90472 IMMUNIZATION ADMIN EACH ADD: CPT | Performed by: FAMILY MEDICINE

## 2023-01-16 PROCEDURE — 90471 IMMUNIZATION ADMIN: CPT | Performed by: FAMILY MEDICINE

## 2023-01-16 PROCEDURE — 99396 PREV VISIT EST AGE 40-64: CPT | Performed by: FAMILY MEDICINE

## 2023-01-16 PROCEDURE — 3079F DIAST BP 80-89 MM HG: CPT | Performed by: FAMILY MEDICINE

## 2023-01-16 RX ORDER — PROPRANOLOL HCL 60 MG
1 CAPSULE, EXTENDED RELEASE 24HR ORAL DAILY
Qty: 90 CAPSULE | Refills: 3 | Status: SHIPPED | OUTPATIENT
Start: 2023-01-16 | End: 2024-01-11

## 2023-01-16 RX ORDER — TERBINAFINE HYDROCHLORIDE 250 MG/1
250 TABLET ORAL DAILY
Qty: 42 TABLET | Refills: 0 | Status: SHIPPED | OUTPATIENT
Start: 2023-01-16

## 2023-02-13 ENCOUNTER — LABORATORY ENCOUNTER (OUTPATIENT)
Dept: LAB | Age: 63
End: 2023-02-13
Attending: FAMILY MEDICINE
Payer: COMMERCIAL

## 2023-02-13 DIAGNOSIS — E11.9 TYPE 2 DIABETES MELLITUS WITHOUT COMPLICATION, WITHOUT LONG-TERM CURRENT USE OF INSULIN (HCC): ICD-10-CM

## 2023-02-13 DIAGNOSIS — Z12.5 PROSTATE CANCER SCREENING: ICD-10-CM

## 2023-02-13 DIAGNOSIS — Z00.00 WELLNESS EXAMINATION: ICD-10-CM

## 2023-02-13 DIAGNOSIS — Z13.0 SCREENING FOR DEFICIENCY ANEMIA: ICD-10-CM

## 2023-02-13 DIAGNOSIS — Z13.220 LIPID SCREENING: ICD-10-CM

## 2023-02-13 LAB
ALBUMIN SERPL-MCNC: 4.2 G/DL (ref 3.4–5)
ALBUMIN/GLOB SERPL: 1.2 {RATIO} (ref 1–2)
ALP LIVER SERPL-CCNC: 89 U/L
ALT SERPL-CCNC: 38 U/L
ANION GAP SERPL CALC-SCNC: 6 MMOL/L (ref 0–18)
AST SERPL-CCNC: 25 U/L (ref 15–37)
BASOPHILS # BLD AUTO: 0.04 X10(3) UL (ref 0–0.2)
BASOPHILS NFR BLD AUTO: 0.9 %
BILIRUB SERPL-MCNC: 0.7 MG/DL (ref 0.1–2)
BUN BLD-MCNC: 14 MG/DL (ref 7–18)
CALCIUM BLD-MCNC: 9.3 MG/DL (ref 8.5–10.1)
CHLORIDE SERPL-SCNC: 103 MMOL/L (ref 98–112)
CHOLEST SERPL-MCNC: 98 MG/DL (ref ?–200)
CO2 SERPL-SCNC: 27 MMOL/L (ref 21–32)
COMPLEXED PSA SERPL-MCNC: 4.41 NG/ML (ref ?–4)
CREAT BLD-MCNC: 1.33 MG/DL
CREAT UR-SCNC: 241 MG/DL
EOSINOPHIL # BLD AUTO: 0.29 X10(3) UL (ref 0–0.7)
EOSINOPHIL NFR BLD AUTO: 6.4 %
ERYTHROCYTE [DISTWIDTH] IN BLOOD BY AUTOMATED COUNT: 13.6 %
EST. AVERAGE GLUCOSE BLD GHB EST-MCNC: 177 MG/DL (ref 68–126)
FASTING PATIENT LIPID ANSWER: YES
FASTING STATUS PATIENT QL REPORTED: YES
GFR SERPLBLD BASED ON 1.73 SQ M-ARVRAT: 61 ML/MIN/1.73M2 (ref 60–?)
GLOBULIN PLAS-MCNC: 3.6 G/DL (ref 2.8–4.4)
GLUCOSE BLD-MCNC: 185 MG/DL (ref 70–99)
HBA1C MFR BLD: 7.8 % (ref ?–5.7)
HCT VFR BLD AUTO: 44.3 %
HDLC SERPL-MCNC: 37 MG/DL (ref 40–59)
HGB BLD-MCNC: 14.5 G/DL
IMM GRANULOCYTES # BLD AUTO: 0 X10(3) UL (ref 0–1)
IMM GRANULOCYTES NFR BLD: 0 %
LDLC SERPL CALC-MCNC: 37 MG/DL (ref ?–100)
LYMPHOCYTES # BLD AUTO: 1.54 X10(3) UL (ref 1–4)
LYMPHOCYTES NFR BLD AUTO: 34 %
MCH RBC QN AUTO: 30.1 PG (ref 26–34)
MCHC RBC AUTO-ENTMCNC: 32.7 G/DL (ref 31–37)
MCV RBC AUTO: 92.1 FL
MICROALBUMIN UR-MCNC: 3.42 MG/DL
MICROALBUMIN/CREAT 24H UR-RTO: 14.2 UG/MG (ref ?–30)
MONOCYTES # BLD AUTO: 0.48 X10(3) UL (ref 0.1–1)
MONOCYTES NFR BLD AUTO: 10.6 %
NEUTROPHILS # BLD AUTO: 2.18 X10 (3) UL (ref 1.5–7.7)
NEUTROPHILS # BLD AUTO: 2.18 X10(3) UL (ref 1.5–7.7)
NEUTROPHILS NFR BLD AUTO: 48.1 %
NONHDLC SERPL-MCNC: 61 MG/DL (ref ?–130)
OSMOLALITY SERPL CALC.SUM OF ELEC: 287 MOSM/KG (ref 275–295)
PLATELET # BLD AUTO: 197 10(3)UL (ref 150–450)
POTASSIUM SERPL-SCNC: 4.3 MMOL/L (ref 3.5–5.1)
PROT SERPL-MCNC: 7.8 G/DL (ref 6.4–8.2)
RBC # BLD AUTO: 4.81 X10(6)UL
SODIUM SERPL-SCNC: 136 MMOL/L (ref 136–145)
TRIGL SERPL-MCNC: 135 MG/DL (ref 30–149)
VLDLC SERPL CALC-MCNC: 18 MG/DL (ref 0–30)
WBC # BLD AUTO: 4.5 X10(3) UL (ref 4–11)

## 2023-02-13 PROCEDURE — 82570 ASSAY OF URINE CREATININE: CPT

## 2023-02-13 PROCEDURE — 80061 LIPID PANEL: CPT

## 2023-02-13 PROCEDURE — 85025 COMPLETE CBC W/AUTO DIFF WBC: CPT

## 2023-02-13 PROCEDURE — 83036 HEMOGLOBIN GLYCOSYLATED A1C: CPT

## 2023-02-13 PROCEDURE — 80053 COMPREHEN METABOLIC PANEL: CPT

## 2023-02-13 PROCEDURE — 36415 COLL VENOUS BLD VENIPUNCTURE: CPT

## 2023-02-13 PROCEDURE — 82043 UR ALBUMIN QUANTITATIVE: CPT

## 2023-02-22 DIAGNOSIS — R97.20 ELEVATED PSA: Primary | ICD-10-CM

## 2023-02-27 DIAGNOSIS — G25.0 ESSENTIAL TREMOR: ICD-10-CM

## 2023-02-28 RX ORDER — PROPRANOLOL HCL 60 MG
1 CAPSULE, EXTENDED RELEASE 24HR ORAL DAILY
Qty: 90 CAPSULE | Refills: 3 | Status: SHIPPED | OUTPATIENT
Start: 2023-02-28 | End: 2024-02-23

## 2023-03-09 DIAGNOSIS — R73.09 ELEVATED GLUCOSE: Primary | ICD-10-CM

## 2023-05-15 ENCOUNTER — LAB ENCOUNTER (OUTPATIENT)
Dept: LAB | Facility: HOSPITAL | Age: 63
End: 2023-05-15
Attending: UROLOGY
Payer: COMMERCIAL

## 2023-05-15 ENCOUNTER — OFFICE VISIT (OUTPATIENT)
Dept: SURGERY | Facility: CLINIC | Age: 63
End: 2023-05-15

## 2023-05-15 DIAGNOSIS — R97.20 ELEVATED PSA: Primary | ICD-10-CM

## 2023-05-15 DIAGNOSIS — R31.0 GROSS HEMATURIA: ICD-10-CM

## 2023-05-15 LAB
APPEARANCE: CLEAR
BILIRUBIN: NEGATIVE
GLUCOSE (URINE DIPSTICK): NEGATIVE MG/DL
LEUKOCYTES: NEGATIVE
MULTISTIX LOT#: NORMAL NUMERIC
NITRITE, URINE: NEGATIVE
OCCULT BLOOD: NEGATIVE
PH, URINE: 6 (ref 4.5–8)
PROTEIN (URINE DIPSTICK): NEGATIVE MG/DL
SPECIFIC GRAVITY: 1.01 (ref 1–1.03)
URINE-COLOR: YELLOW
UROBILINOGEN,SEMI-QN: 0.2 MG/DL (ref 0–1.9)

## 2023-05-15 PROCEDURE — 36415 COLL VENOUS BLD VENIPUNCTURE: CPT

## 2023-05-17 ENCOUNTER — TELEPHONE (OUTPATIENT)
Dept: SURGERY | Facility: CLINIC | Age: 63
End: 2023-05-17

## 2023-05-17 DIAGNOSIS — R97.20 ELEVATED PSA: Primary | ICD-10-CM

## 2023-05-17 NOTE — TELEPHONE ENCOUNTER
Patient is going to call back to schedule cystoscopy. Also recommend f/u with me in 1 y with PSA prior. Below if for Documentation Purposes Only:  4K 9.6% and repeat PSA down to 3. 10. Reviewed OTP.

## 2023-05-28 ENCOUNTER — HOSPITAL ENCOUNTER (OUTPATIENT)
Age: 63
Discharge: HOME | End: 2023-05-28
Payer: COMMERCIAL

## 2023-05-28 VITALS
WEIGHT: 180 LBS | BODY MASS INDEX: 26.66 KG/M2 | OXYGEN SATURATION: 97 % | SYSTOLIC BLOOD PRESSURE: 134 MMHG | DIASTOLIC BLOOD PRESSURE: 79 MMHG | HEART RATE: 57 BPM | HEIGHT: 69 IN | RESPIRATION RATE: 20 BRPM | TEMPERATURE: 97 F

## 2023-05-28 DIAGNOSIS — J02.9 VIRAL PHARYNGITIS: Primary | ICD-10-CM

## 2023-05-28 LAB
S PYO AG THROAT QL IA.RAPID: NEGATIVE
SARS-COV-2 RNA RESP QL NAA+PROBE: NOT DETECTED

## 2023-05-28 PROCEDURE — 87651 STREP A DNA AMP PROBE: CPT | Performed by: NURSE PRACTITIONER

## 2023-05-28 PROCEDURE — 99212 OFFICE O/P EST SF 10 MIN: CPT

## 2023-05-28 PROCEDURE — 99213 OFFICE O/P EST LOW 20 MIN: CPT

## 2023-05-28 NOTE — ED INITIAL ASSESSMENT (HPI)
C/o sore throat for over 7 days. Pt reports sore throat has gotten worse or time. Pt denies exposure to strep. Pt reports otc meds for symptoms.

## 2023-05-28 NOTE — DISCHARGE INSTRUCTIONS
You may perform salt water gargles to help with sore throat. Please take over-the-counter Tylenol and/or Motrin for pain relief. Replace your toothbrush.

## 2023-06-03 ENCOUNTER — NURSE ONLY (OUTPATIENT)
Dept: FAMILY MEDICINE CLINIC | Facility: CLINIC | Age: 63
End: 2023-06-03
Payer: COMMERCIAL

## 2023-06-03 DIAGNOSIS — Z23 NEED FOR SHINGLES VACCINE: Primary | ICD-10-CM

## 2023-06-03 PROCEDURE — 90471 IMMUNIZATION ADMIN: CPT | Performed by: FAMILY MEDICINE

## 2023-06-03 PROCEDURE — 90750 HZV VACC RECOMBINANT IM: CPT | Performed by: FAMILY MEDICINE

## 2023-06-20 RX ORDER — BLOOD SUGAR DIAGNOSTIC
STRIP MISCELLANEOUS
Qty: 300 STRIP | Refills: 3 | Status: SHIPPED | OUTPATIENT
Start: 2023-06-20

## 2023-06-21 ENCOUNTER — HOSPITAL ENCOUNTER (OUTPATIENT)
Dept: CT IMAGING | Age: 63
Discharge: HOME | End: 2023-06-21
Attending: UROLOGY
Payer: COMMERCIAL

## 2023-06-21 DIAGNOSIS — R31.0 GROSS HEMATURIA: ICD-10-CM

## 2023-06-21 LAB
CREAT BLD-MCNC: 1.2 MG/DL
GFR SERPLBLD BASED ON 1.73 SQ M-ARVRAT: 69 ML/MIN/1.73M2 (ref 60–?)

## 2023-06-21 PROCEDURE — 82565 ASSAY OF CREATININE: CPT

## 2023-06-21 PROCEDURE — 74178 CT ABD&PLV WO CNTR FLWD CNTR: CPT | Performed by: UROLOGY

## 2023-06-21 PROCEDURE — 76377 3D RENDER W/INTRP POSTPROCES: CPT | Performed by: UROLOGY

## 2023-06-21 NOTE — PROGRESS NOTES
Results reviewed. Please inform patient CT shows bladder wall thickening. Recommend non-urgent cystoscopy to evaluate.     Thanks,  MPH    CTU 6/21/23: DONY

## 2023-09-18 ENCOUNTER — PROCEDURE (OUTPATIENT)
Dept: SURGERY | Facility: CLINIC | Age: 63
End: 2023-09-18

## 2023-09-18 DIAGNOSIS — R31.0 GROSS HEMATURIA: Primary | ICD-10-CM

## 2023-09-18 DIAGNOSIS — N13.8 BPH WITH OBSTRUCTION/LOWER URINARY TRACT SYMPTOMS: ICD-10-CM

## 2023-09-18 DIAGNOSIS — N39.43 POST-VOID DRIBBLING: ICD-10-CM

## 2023-09-18 DIAGNOSIS — R97.20 ELEVATED PSA: ICD-10-CM

## 2023-09-18 DIAGNOSIS — N40.1 BPH WITH OBSTRUCTION/LOWER URINARY TRACT SYMPTOMS: ICD-10-CM

## 2023-09-18 LAB
APPEARANCE: CLEAR
BILIRUBIN: NEGATIVE
GLUCOSE (URINE DIPSTICK): 500 MG/DL
LEUKOCYTES: NEGATIVE
MULTISTIX LOT#: ABNORMAL NUMERIC
NITRITE, URINE: NEGATIVE
OCCULT BLOOD: NEGATIVE
PH, URINE: 5.5 (ref 4.5–8)
PROTEIN (URINE DIPSTICK): NEGATIVE MG/DL
SPECIFIC GRAVITY: 1.02 (ref 1–1.03)
URINE-COLOR: YELLOW
UROBILINOGEN,SEMI-QN: 0.2 MG/DL (ref 0–1.9)

## 2023-09-18 RX ORDER — CIPROFLOXACIN 500 MG/1
500 TABLET, FILM COATED ORAL ONCE
Status: COMPLETED | OUTPATIENT
Start: 2023-09-18 | End: 2023-09-18

## 2023-09-18 RX ADMIN — CIPROFLOXACIN 500 MG: 500 TABLET, FILM COATED ORAL at 14:51:00

## 2024-01-22 DIAGNOSIS — E11.9 TYPE 2 DIABETES MELLITUS WITHOUT COMPLICATION, WITHOUT LONG-TERM CURRENT USE OF INSULIN (HCC): ICD-10-CM

## 2024-01-22 NOTE — TELEPHONE ENCOUNTER
.A refill request was received for:  Requested Prescriptions     Pending Prescriptions Disp Refills    METFORMIN HCL 1000 MG Oral Tab [Pharmacy Med Name: metFORMIN HCl 1000 MG Oral Tablet] 180 tablet 0     Sig: TAKE 1 TABLET BY MOUTH TWICE DAILY WITH MEALS       Last refill date:   1/16/2023    Last office visit: 1/16/2023    Follow up due:  Future Appointments   Date Time Provider Department Center   9/23/2024  4:15 PM Rashid Shannon MD [id number]

## 2024-01-27 DIAGNOSIS — E78.1 HYPERTRIGLYCERIDEMIA: ICD-10-CM

## 2024-01-29 RX ORDER — ATORVASTATIN CALCIUM 20 MG/1
20 TABLET, FILM COATED ORAL NIGHTLY
Qty: 90 TABLET | Refills: 3 | Status: SHIPPED | OUTPATIENT
Start: 2024-01-29

## 2024-02-06 ENCOUNTER — OFFICE VISIT (OUTPATIENT)
Dept: FAMILY MEDICINE CLINIC | Facility: CLINIC | Age: 64
End: 2024-02-06
Payer: COMMERCIAL

## 2024-02-06 VITALS
HEART RATE: 58 BPM | RESPIRATION RATE: 16 BRPM | WEIGHT: 176 LBS | SYSTOLIC BLOOD PRESSURE: 124 MMHG | OXYGEN SATURATION: 98 % | BODY MASS INDEX: 26.07 KG/M2 | DIASTOLIC BLOOD PRESSURE: 80 MMHG | HEIGHT: 69 IN

## 2024-02-06 DIAGNOSIS — Z12.12 SCREENING FOR COLORECTAL CANCER: ICD-10-CM

## 2024-02-06 DIAGNOSIS — Z13.0 SCREENING FOR DEFICIENCY ANEMIA: ICD-10-CM

## 2024-02-06 DIAGNOSIS — R73.09 ELEVATED GLUCOSE: ICD-10-CM

## 2024-02-06 DIAGNOSIS — M75.42 SHOULDER IMPINGEMENT SYNDROME, LEFT: ICD-10-CM

## 2024-02-06 DIAGNOSIS — Z12.11 SCREENING FOR COLORECTAL CANCER: ICD-10-CM

## 2024-02-06 DIAGNOSIS — Z13.220 LIPID SCREENING: ICD-10-CM

## 2024-02-06 DIAGNOSIS — Z13.1 DIABETES MELLITUS SCREENING: ICD-10-CM

## 2024-02-06 DIAGNOSIS — Z00.00 WELLNESS EXAMINATION: Primary | ICD-10-CM

## 2024-02-06 PROCEDURE — 3079F DIAST BP 80-89 MM HG: CPT | Performed by: FAMILY MEDICINE

## 2024-02-06 PROCEDURE — 99396 PREV VISIT EST AGE 40-64: CPT | Performed by: FAMILY MEDICINE

## 2024-02-06 PROCEDURE — 3074F SYST BP LT 130 MM HG: CPT | Performed by: FAMILY MEDICINE

## 2024-02-06 PROCEDURE — 3008F BODY MASS INDEX DOCD: CPT | Performed by: FAMILY MEDICINE

## 2024-02-06 RX ORDER — NAPROXEN 500 MG/1
500 TABLET ORAL 2 TIMES DAILY WITH MEALS
Qty: 28 TABLET | Refills: 0 | Status: SHIPPED | OUTPATIENT
Start: 2024-02-06

## 2024-02-06 NOTE — PROGRESS NOTES
HPI:   Jeanne Arnold is a 63 year old male who presents for an Annual Health Visit.     Left arm issues, about the past month or so, will have pain lifting  up arm or moving backward.   Hasn't had toruble like this before, can get the arm up but can become quite painful.    Has tried topical medicine pad. Did have acupuncture, and helped temporarily.    No major changes in activities.    No major changes in life or health    Due to colonoscopy this year.    Needs eyre record, has appointment done. [employer] [employer]              Allergies:   No Known Allergies    CURRENT MEDICATIONS   Current Outpatient Medications   Medication Sig Dispense Refill    naproxen 500 MG Oral Tab Take 1 tablet (500 mg total) by mouth 2 (two) times daily with meals. 28 tablet 0    atorvastatin 20 MG Oral Tab Take 1 tablet (20 mg total) by mouth nightly. 90 tablet 3    metFORMIN HCl 1000 MG Oral Tab Take 1 tablet (1,000 mg total) by mouth 2 (two) times daily with meals. 180 tablet 0    Glucose Blood (ACCU-CHEK GUIDE) In Vitro Strip TEST 3 TIMES A DAY. 300 strip 3    Propranolol HCl ER 60 MG Oral Capsule SR 24 Hr Take 1 capsule (60 mg total) by mouth daily. 90 capsule 3    ACCU-CHEK FASTCLIX LANCETS Does not apply Misc Test once daily 100 each 3    Multiple Vitamins-Minerals (CENTRUM SILVER OR) Take  by mouth.        HISTORICAL INFORMATION   Past Medical History:   Diagnosis Date    Abdominal pain, unspecified site 6/29/2012    Acute gastritis without mention of hemorrhage 6/29/2012    Screening for other and unspecified endocrine, nutritional, metabolic, and immunity disorders 6/29/2012    Sprain of ankle, unspecified site     Sprain of wrist, unspecified site       No past surgical history on file.   Family History   Problem Relation Age of Onset    Heart Attack Father       SOCIAL HISTORY   Social History     Socioeconomic History    Marital status:    Tobacco Use    Smoking status: Never    Smokeless tobacco: Never    Vaping Use    Vaping Use: Never used   Substance and Sexual Activity    Alcohol use: Yes     Alcohol/week: 0.0 standard drinks of alcohol     Comment: ONCE IN A WHILE.    Drug use: No   Other Topics Concern    Caffeine Concern Yes     Comment: 1-2 cups/day    Exercise No     Social History     Social History Narrative    Not on file        REVIEW OF SYSTEMS:     Constitutional: negative  Eyes: negative  ENT:  ongoing tinnitus, maybe decreased some  Respiratory: negative  Cardiovascular: negative  Gastrointestinal: negative  Integument/Breast: negative  Genitourinary: negative  Heme/Lymph: negative  Musculoskeletal:  see HPI  Neurological: negative  Psych: negative  Endocrine: negative  Allergic/Immune: negative    EXAM:   /80   Pulse 58   Resp 16   Ht 5' 9\" (1.753 m)   Wt 176 lb (79.8 kg)   SpO2 98%   BMI 25.99 kg/m²    Wt Readings from Last 6 Encounters:   02/06/24 176 lb (79.8 kg)   05/28/23 180 lb (81.6 kg)   01/16/23 178 lb (80.7 kg)   07/24/22 170 lb (77.1 kg)   01/04/22 173 lb (78.5 kg)   12/31/20 170 lb (77.1 kg)     Body mass index is 25.99 kg/m².    General: alert, appears stated age, and cooperative  Head: Normocephalic, without obvious abnormality, atraumatic  Eyes: conjunctivae/corneas clear. PERRL, EOM's intact. Fundi benign.  Ears: normal TM's and external ear canals both ears  Nose: Nares normal. Septum midline. Mucosa normal. No drainage or sinus tenderness.  Throat: lips, mucosa, and tongue normal; teeth and gums normal  Neck: no adenopathy, no carotid bruit, no JVD, supple, symmetrical, trachea midline, and thyroid not enlarged, symmetric, no tenderness/mass/nodules  Heart: S1, S2 normal, no murmur, click, rub or gallop, regular rate and rhythm  Lungs: clear to auscultation bilaterally  Chest wall: no tenderness  Abdomen: soft, non-tender; bowel sounds normal; no masses,  no organomegaly  : deferred  Back: symmetric, no curvature. ROM normal. No CVA tenderness.  Extremities:  extremities normal, atraumatic, no cyanosis or edema left arm can't abduct much beyond shoulder height, posterior rotation limited external rotation weaker, pain on impingement testing on left and not on right  Pulses: 2+ and symmetric  Skin: Skin color, texture, turgor normal. No rashes or lesions  Lymph Nodes: Cervical, supraclavicular, and axillary nodes normal.  Neurologic: Grossly normal  Bilateral barefoot skin diabetic exam is normal, visualized feet and the appearance is normal.  Bilateral monofilament/sensation of both feet is normal.  Pulsation pedal pulse exam of both lower legs/feet is normal as well.      ASSESSMENT AND PLAN:   Vi was seen today for physical.    Diagnoses and all orders for this visit:    Wellness examination  -     Comp Metabolic Panel (14); Future  -     CBC With Differential With Platelet; Future  -     Hemoglobin A1C; Future  -     Lipid Panel; Future  -     Microalb/Creat Ratio, Random Urine; Future    Elevated glucose  -     Hemoglobin A1C; Future    Diabetes mellitus screening  -     Microalb/Creat Ratio, Random Urine; Future    Lipid screening  -     Lipid Panel; Future    Screening for deficiency anemia  -     CBC With Differential With Platelet; Future    Screening for colorectal cancer  -     Gastro Referral - In Network    Shoulder impingement syndrome, left  -     naproxen 500 MG Oral Tab; Take 1 tablet (500 mg total) by mouth 2 (two) times daily with meals.        Will treat to see if resolves symptoms and discussed exercises, if not resolving consider imaging/pt  There are no Patient Instructions on file for this visit.        The patient indicates understanding of these issues and agrees to the plan.    Problem List:  Patient Active Problem List   Diagnosis    Esophageal reflux    Essential and other specified forms of tremor    Other chronic nonalcoholic liver disease    Type 2 diabetes mellitus without complication (HCC)       Austin Wallace MD  2/6/2024  9:05  AM

## 2024-02-17 ENCOUNTER — LABORATORY ENCOUNTER (OUTPATIENT)
Dept: LAB | Age: 64
End: 2024-02-17
Attending: FAMILY MEDICINE
Payer: COMMERCIAL

## 2024-02-17 DIAGNOSIS — R73.09 ELEVATED GLUCOSE: ICD-10-CM

## 2024-02-17 DIAGNOSIS — Z13.0 SCREENING FOR DEFICIENCY ANEMIA: ICD-10-CM

## 2024-02-17 DIAGNOSIS — Z13.1 DIABETES MELLITUS SCREENING: ICD-10-CM

## 2024-02-17 DIAGNOSIS — Z13.220 LIPID SCREENING: ICD-10-CM

## 2024-02-17 DIAGNOSIS — Z00.00 WELLNESS EXAMINATION: ICD-10-CM

## 2024-02-17 LAB
ALBUMIN SERPL-MCNC: 4.3 G/DL (ref 3.4–5)
ALBUMIN/GLOB SERPL: 1.6 {RATIO} (ref 1–2)
ALP LIVER SERPL-CCNC: 91 U/L
ALT SERPL-CCNC: 25 U/L
ANION GAP SERPL CALC-SCNC: 4 MMOL/L (ref 0–18)
AST SERPL-CCNC: 9 U/L (ref 15–37)
BASOPHILS # BLD AUTO: 0.02 X10(3) UL (ref 0–0.2)
BASOPHILS NFR BLD AUTO: 0.5 %
BILIRUB SERPL-MCNC: 0.6 MG/DL (ref 0.1–2)
BUN BLD-MCNC: 19 MG/DL (ref 9–23)
CALCIUM BLD-MCNC: 8.8 MG/DL (ref 8.5–10.1)
CHLORIDE SERPL-SCNC: 108 MMOL/L (ref 98–112)
CHOLEST SERPL-MCNC: 91 MG/DL (ref ?–200)
CO2 SERPL-SCNC: 28 MMOL/L (ref 21–32)
CREAT BLD-MCNC: 1.14 MG/DL
CREAT UR-SCNC: 202 MG/DL
EGFRCR SERPLBLD CKD-EPI 2021: 73 ML/MIN/1.73M2 (ref 60–?)
EOSINOPHIL # BLD AUTO: 0.23 X10(3) UL (ref 0–0.7)
EOSINOPHIL NFR BLD AUTO: 5.6 %
ERYTHROCYTE [DISTWIDTH] IN BLOOD BY AUTOMATED COUNT: 13.2 %
EST. AVERAGE GLUCOSE BLD GHB EST-MCNC: 183 MG/DL (ref 68–126)
FASTING PATIENT LIPID ANSWER: YES
FASTING STATUS PATIENT QL REPORTED: YES
GLOBULIN PLAS-MCNC: 2.7 G/DL (ref 2.8–4.4)
GLUCOSE BLD-MCNC: 186 MG/DL (ref 70–99)
HBA1C MFR BLD: 8 % (ref ?–5.7)
HCT VFR BLD AUTO: 41.4 %
HDLC SERPL-MCNC: 37 MG/DL (ref 40–59)
HGB BLD-MCNC: 13.8 G/DL
IMM GRANULOCYTES # BLD AUTO: 0.01 X10(3) UL (ref 0–1)
IMM GRANULOCYTES NFR BLD: 0.2 %
LDLC SERPL CALC-MCNC: 35 MG/DL (ref ?–100)
LYMPHOCYTES # BLD AUTO: 1.22 X10(3) UL (ref 1–4)
LYMPHOCYTES NFR BLD AUTO: 29.8 %
MCH RBC QN AUTO: 29.9 PG (ref 26–34)
MCHC RBC AUTO-ENTMCNC: 33.3 G/DL (ref 31–37)
MCV RBC AUTO: 89.6 FL
MICROALBUMIN UR-MCNC: 5.12 MG/DL
MICROALBUMIN/CREAT 24H UR-RTO: 25.3 UG/MG (ref ?–30)
MONOCYTES # BLD AUTO: 0.37 X10(3) UL (ref 0.1–1)
MONOCYTES NFR BLD AUTO: 9 %
NEUTROPHILS # BLD AUTO: 2.24 X10 (3) UL (ref 1.5–7.7)
NEUTROPHILS # BLD AUTO: 2.24 X10(3) UL (ref 1.5–7.7)
NEUTROPHILS NFR BLD AUTO: 54.9 %
NONHDLC SERPL-MCNC: 54 MG/DL (ref ?–130)
OSMOLALITY SERPL CALC.SUM OF ELEC: 297 MOSM/KG (ref 275–295)
PLATELET # BLD AUTO: 204 10(3)UL (ref 150–450)
POTASSIUM SERPL-SCNC: 4.4 MMOL/L (ref 3.5–5.1)
PROT SERPL-MCNC: 7 G/DL (ref 6.4–8.2)
RBC # BLD AUTO: 4.62 X10(6)UL
SODIUM SERPL-SCNC: 140 MMOL/L (ref 136–145)
TRIGL SERPL-MCNC: 97 MG/DL (ref 30–149)
VLDLC SERPL CALC-MCNC: 13 MG/DL (ref 0–30)
WBC # BLD AUTO: 4.1 X10(3) UL (ref 4–11)

## 2024-02-17 PROCEDURE — 3052F HG A1C>EQUAL 8.0%<EQUAL 9.0%: CPT | Performed by: FAMILY MEDICINE

## 2024-02-17 PROCEDURE — 80061 LIPID PANEL: CPT

## 2024-02-17 PROCEDURE — 3061F NEG MICROALBUMINURIA REV: CPT | Performed by: FAMILY MEDICINE

## 2024-02-17 PROCEDURE — 83036 HEMOGLOBIN GLYCOSYLATED A1C: CPT

## 2024-02-17 PROCEDURE — 82570 ASSAY OF URINE CREATININE: CPT

## 2024-02-17 PROCEDURE — 85025 COMPLETE CBC W/AUTO DIFF WBC: CPT

## 2024-02-17 PROCEDURE — 36415 COLL VENOUS BLD VENIPUNCTURE: CPT

## 2024-02-17 PROCEDURE — 82043 UR ALBUMIN QUANTITATIVE: CPT

## 2024-02-17 PROCEDURE — 80053 COMPREHEN METABOLIC PANEL: CPT

## 2024-03-22 ENCOUNTER — OFFICE VISIT (OUTPATIENT)
Dept: FAMILY MEDICINE CLINIC | Facility: CLINIC | Age: 64
End: 2024-03-22
Payer: COMMERCIAL

## 2024-03-22 VITALS
HEIGHT: 69 IN | DIASTOLIC BLOOD PRESSURE: 78 MMHG | RESPIRATION RATE: 16 BRPM | BODY MASS INDEX: 25.92 KG/M2 | SYSTOLIC BLOOD PRESSURE: 110 MMHG | HEART RATE: 84 BPM | WEIGHT: 175 LBS | OXYGEN SATURATION: 98 %

## 2024-03-22 DIAGNOSIS — E11.9 TYPE 2 DIABETES MELLITUS WITHOUT COMPLICATION, WITHOUT LONG-TERM CURRENT USE OF INSULIN (HCC): Primary | ICD-10-CM

## 2024-03-22 DIAGNOSIS — M75.42 SHOULDER IMPINGEMENT SYNDROME, LEFT: ICD-10-CM

## 2024-03-22 PROCEDURE — 3074F SYST BP LT 130 MM HG: CPT | Performed by: FAMILY MEDICINE

## 2024-03-22 PROCEDURE — 99214 OFFICE O/P EST MOD 30 MIN: CPT | Performed by: FAMILY MEDICINE

## 2024-03-22 PROCEDURE — 3078F DIAST BP <80 MM HG: CPT | Performed by: FAMILY MEDICINE

## 2024-03-22 PROCEDURE — 3008F BODY MASS INDEX DOCD: CPT | Performed by: FAMILY MEDICINE

## 2024-03-22 NOTE — PROGRESS NOTES
[facility] Progress Note    SUBJECTIVE: Jeanne Arnold 63 year old male is here today for   Chief Complaint   Patient presents with    Shoulder Pain     Left shoulder, pain has improved slightlu       Discussed diabetes, will add medications.    Shoulder gradually improving, is on and off, overall getting better. Has been 5 to 6 weeks since I saw him.          PMH  Past Medical History:   Diagnosis Date    Abdominal pain, unspecified site 6/29/2012    Acute gastritis without mention of hemorrhage 6/29/2012    Screening for other and unspecified endocrine, nutritional, metabolic, and immunity disorders 6/29/2012    Sprain of ankle, unspecified site     Sprain of wrist, unspecified site         PSH  No past surgical history on file.     Social Hx:  No changes    ROS  See HPI    OBJECTIVE:  /78   Pulse 84   Resp 16   Ht 5' 9\" (1.753 m)   Wt 175 lb (79.4 kg)   SpO2 98%   BMI 25.84 kg/m²     Exam    Ext: left arm unable to abduct  beyond shoulder height, posterior rotation limited external rotation weaker, pain on impingement testing on left and not on right       Labs:          Meds:   Current Outpatient Medications   Medication Sig Dispense Refill    empagliflozin (JARDIANCE) 10 MG Oral Tab Take 1 tablet (10 mg total) by mouth daily. 90 tablet 1    atorvastatin 20 MG Oral Tab Take 1 tablet (20 mg total) by mouth nightly. 90 tablet 3    metFORMIN HCl 1000 MG Oral Tab Take 1 tablet (1,000 mg total) by mouth 2 (two) times daily with meals. 180 tablet 0    Glucose Blood (ACCU-CHEK GUIDE) In Vitro Strip TEST 3 TIMES A DAY. 300 strip 3    ACCU-CHEK FASTCLIX LANCETS Does not apply Misc Test once daily 100 each 3    Multiple Vitamins-Minerals (CENTRUM SILVER OR) Take  by mouth.           Assessment/Plan  Jeanne was seen today for shoulder pain.    Diagnoses and all orders for this visit:    Type 2 diabetes mellitus without complication, without long-term current use of insulin ([facility])  -     empagliflozin (JARDIANCE)  10 MG Oral Tab; Take 1 tablet (10 mg total) by mouth daily.    Shoulder impingement syndrome, left  -     Physical Therapy Referral - [facility]         Will recommend PT for shoulder and  MRI if not resolving    Added jardiance, recheck A1c in 3 months         Total Time spent with patient and coordinating care:  15 minutes.    Follow up: as needed      Austin Wallace MD

## 2024-03-28 ENCOUNTER — TELEPHONE (OUTPATIENT)
Dept: FAMILY MEDICINE CLINIC | Facility: CLINIC | Age: 64
End: 2024-03-28

## 2024-03-28 NOTE — TELEPHONE ENCOUNTER
Opt 2 ref #[id number]    Script received by pharmacy w/o dose info - could not hear name of script

## 2024-04-21 DIAGNOSIS — E11.9 TYPE 2 DIABETES MELLITUS WITHOUT COMPLICATION, WITHOUT LONG-TERM CURRENT USE OF INSULIN (HCC): ICD-10-CM

## 2024-04-22 ENCOUNTER — TELEPHONE (OUTPATIENT)
Dept: PHYSICAL THERAPY | Facility: HOSPITAL | Age: 64
End: 2024-04-22

## 2024-04-24 ENCOUNTER — OFFICE VISIT (OUTPATIENT)
Dept: PHYSICAL THERAPY | Age: 64
End: 2024-04-24
Attending: FAMILY MEDICINE
Payer: COMMERCIAL

## 2024-04-24 DIAGNOSIS — M75.42 SHOULDER IMPINGEMENT SYNDROME, LEFT: Primary | ICD-10-CM

## 2024-04-24 PROCEDURE — 97110 THERAPEUTIC EXERCISES: CPT

## 2024-04-24 PROCEDURE — 97161 PT EVAL LOW COMPLEX 20 MIN: CPT

## 2024-04-24 NOTE — PROGRESS NOTES
SHOULDER EVALUATION:     Diagnosis:   Shoulder impingement syndrome, left (M75.42)     Referring Provider: Austin Wallace  Date of Evaluation:    4/24/2024    Precautions:  None Next MD visit:   none scheduled  Date of Surgery: n/a     PATIENT SUMMARY   Jeanne Arnold is a 63 year old male who presents to therapy today with complaints of left shoulder pain that began about 2-3months ago. Denies known AMADEO. Thinks way he sleeps may contribute. Also recalls years ago he had issue with his hands shaking. Was told this could be coming from his neck. Had MRI which confirmed. Continues to exhibit these symptoms. He started feeling difficulty lifting his left shoulder forward and out to the side 2-3months ago. Pain present at shoulder and down to upper arm. No pain at rest. Was prescribed pain medication which did not help. Denies n/t. Pt is right side dominant. Works in [employer] with [employer]. No issue with work.  Pt describes pain level current 0/10, at best 0/10, at worst 9/10.   Current functional limitations include reaching forward, reaching out to the side, reaching behind back, overhead reaching..     Vi describes prior level of function pain free and unlimited function prior to 2-3months ago. Pt goals include: get back to normal..  Past medical history was reviewed with Vi. Significant findings include   Past Medical History:    Abdominal pain, unspecified site    Acute gastritis without mention of hemorrhage    Screening for other and unspecified endocrine, nutritional, metabolic, and immunity disorders    Sprain of ankle, unspecified site    Sprain of wrist, unspecified site     No past surgical history on file.      ASSESSMENT  Jeanne presents to physical therapy evaluation with primary c/o L shoulder pain and decreased mobility. The results of the objective tests and measures show decreased L shoulder A/PROM limited by pain and tightness. Min restriction to GHJ mobility. Moderate soft tissue restrictions. Decreased scapular  strength at LUE vs R. Full LUE strength on resisted testing within available range although limited overhead..  Functional deficits include but are not limited to reaching, lifting.  Signs and symptoms are consistent with MD diagnosis. Pt and PT discussed evaluation findings, pathology, POC and HEP.  Pt voiced understanding and performs HEP correctly without reported pain. Skilled Physical Therapy is medically necessary to address the above impairments and reach functional goals.     OBJECTIVE:   Observation/Posture: fwd head, rounded shoulders,  Palpation: moderate soft tissue restrictions at post RTC L  Sensation: intact to light touch  Cervical Screen:   Flex: WNL  Ext: WNL  SB: WNL  Rot: WNL      AROM: (* denotes performed with pain)  Shoulder  Elbow   Flexion: R 180; L 97  Abduction: R 180; L 78  ER: R T2; L behind head ; 31deg 45deg abd  IR: R T11; L L buttock WFL     PROM: (* denotes performed with pain)  Shoulder    Flexion: R 180; L 120  Abduction: R 180; L126  ER: R T2; L behind head ; 31deg 45deg abd  IR: R T11; L L buttock     Accessory motion: min dec L GHJ post and inf glide      Strength/MMT: (* denotes performed with pain)  Shoulder Elbow Scapular   Flexion: R 5/5; L 2/5  Abduction: R 5/5; L 2/5  ER: R 5/5; L 2/5  IR: R 5/5; L 2/5    *L shoulder 5/5 within available range Flexion: R 5/5; L 5/5  Extension: R 5/5; L 5/5 Rhomboids: R5/5, L 3+/5  Mid trap: R 5/5; L 3+/5   Low trap: R 4/5; L 2/5     Special tests:   Impingement testing: (+) L  Apprehension: ROM limiting  Drop arm: (-)    Today’s Treatment and Response:   Pt education was provided on exam findings, treatment diagnosis, treatment plan, expectations, and prognosis. Pt was also provided recommendations for activity modifications, postural corrections, ergonomics, importance of remaining active, and instruction on initial HEP .  Today's Treatment: supine PROM L shoulder all planes, L sidely STM L post RTC, wall slides flex L, doorway pec  stretch low, shoulder IR with towel assist AAROM L, seated scap retract  Patient was instructed in and issued a HEP for: wall slides flex L, doorway pec stretch low, shoulder IR with towel assist AAROM L, seated scap retract    Charges: PT Eval Low Complexity, TherEx x1 (14mins)      Total Timed Treatment: 19mins min     Total Treatment Time: 40 min     Based on clinical rationale and outcome measures, this evaluation involved Low Complexity decision making  PLAN OF CARE:    Goals: (to be met in 8 visits)   Pt will verbalize and demo compliance iw home program at least 75% of the time for indepenent management of symptoms on d/c  Pt will demo improved L Shoulder AROM equal that of R for improved ease of reaching  Pt will demo improved LUE strength at least 1 muscle grade for all deficit motions for improve stability and decreased cervical compensation  Pt will demo improved posture awareness requiring cueing <25% of the time for improved scapulohumeral mechanics    Frequency / Duration: Patient will be seen for 1 x/week or a total of 8 visits over a 90 day period. Treatment will include: Manual Therapy, Neuromuscular Re-education, Therapeutic Activities, Therapeutic Exercise, and Home Exercise Program instruction    Education or treatment limitation: None  Rehab Potential:excellent    QuickDASH Outcome Score  Score: 61.36 % (4/21/2024 10:52 AM)      Patient was advised of these findings, precautions, and treatment options and has agreed to actively participate in planning and for this course of care.    Thank you for your referral. Please co-sign or sign and return this letter via fax as soon as possible to [phone]. If you have any questions, please contact me at Dept: [phone]    Sincerely,  Electronically signed by therapist: Stefanie Glynn, PT,DPT,BSPTS-C2  Physician's certification required: Yes  I certify the need for these services furnished under this plan of treatment and while under my  care.    X___________________________________________________ Date____________________    Certification From: 4/24/2024  To:7/23/2024

## 2024-04-29 DIAGNOSIS — E11.9 TYPE 2 DIABETES MELLITUS WITHOUT COMPLICATION, WITHOUT LONG-TERM CURRENT USE OF INSULIN (HCC): ICD-10-CM

## 2024-05-01 DIAGNOSIS — E11.9 TYPE 2 DIABETES MELLITUS WITHOUT COMPLICATION, WITHOUT LONG-TERM CURRENT USE OF INSULIN (HCC): ICD-10-CM

## 2024-05-02 ENCOUNTER — TELEPHONE (OUTPATIENT)
Dept: FAMILY MEDICINE CLINIC | Facility: CLINIC | Age: 64
End: 2024-05-02

## 2024-05-02 NOTE — TELEPHONE ENCOUNTER
Spoke to pt wife, states they got a message saying rx was denied.  Advised rx was sent 4/29.  Advised to call pharmacy and let us know if there is a problem.

## 2024-05-02 NOTE — TELEPHONE ENCOUNTER
Wife said there is some issue with pt's jardiance script and pharmacy filling it.  Wife did not have more info.  She's asking to check with pharmacy

## 2024-05-08 ENCOUNTER — OFFICE VISIT (OUTPATIENT)
Dept: PHYSICAL THERAPY | Age: 64
End: 2024-05-08
Attending: FAMILY MEDICINE
Payer: COMMERCIAL

## 2024-05-08 PROCEDURE — 97110 THERAPEUTIC EXERCISES: CPT

## 2024-05-08 PROCEDURE — 97140 MANUAL THERAPY 1/> REGIONS: CPT

## 2024-05-08 NOTE — PROGRESS NOTES
Diagnosis:   Shoulder impingement syndrome, left (M75.42)    Referring Provider: Austin Wallace  Date of Evaluation:    4/24/24    Precautions:  None Next MD visit:   none scheduled  Date of Surgery: n/a   Insurance Primary/Secondary: [place] / N/A     [place] [facility] [place]            QuickDASH Outcome Score  Score: 61.36 % (4/21/2024 10:52 AM)     Subjective: Pt reports his shoulder is feeling better. Exercises have been good.    Pain: 0/10      Assessment: Pt presents with improved L shoulder AROM from previous session. Continued with ROM work and addressing soft tissue restrictions. Further improvement in ROM by end of session.     Objective:   AROM: (* denotes performed with pain)  Pre-tx (post-tx)  Shoulder  (standing)   Flexion: R 180; L 108 (118)  Abduction: R 180; L 106 (110)  ER: R T2; L C4 ; 45deg abd  IR: R T11; L L buttock           Goals:   Goals: (to be met in 8 visits)   Pt will verbalize and demo compliance iw home program at least 75% of the time for indepenent management of symptoms on d/c - in progress  Pt will demo improved L Shoulder AROM equal that of R for improved ease of reaching - in progress  Pt will demo improved LUE strength at least 1 muscle grade for all deficit motions for improve stability and decreased cervical compensation - in progress  Pt will demo improved posture awareness requiring cueing <25% of the time for improved scapulohumeral mechanics - in progress    Plan: Continue to address L shoulder ROM restrictions with added work on scapular stabilization  Date: 5/8/2024  TX#: 2/5 Date:                 TX#: 3/ Date:                 TX#: 4/ Date:                 TX#: 5/ Date:   Tx#: 6/   therEx: 30mins  UBE lvl 2.0 3mins fwd/3mins bwd  Supine PROM  L shoulder all planes  Supine shoulder flex AAROM with cane [id number]  Wall slides 10x  ER AAROM with cane 10x  Doorway pec stretch 10x  Shoulder IR up back with green strap 10x  SHAHRIAR YellowTB loop [id number]  Standing rows redTT  20x  Standing low rows yellowTT 20x       Manual: 10mins  Supine: GHJ mobs grade III, Subscap release,  Lsidely: STM L infraspinatus, teres min/kandy, lat                     HEP:   4/24: wall slides flex L, doorway pec stretch low, shoulder IR with towel assist AAROM L, seated scap retract   5/8: shoulder IR up back with towel, SHAHRIAR yellowTB loop, shoulder ER    Charges: TherEx x2, Man x1       Total Timed Treatment: 40 min  Total Treatment Time: 40 min

## 2024-05-15 ENCOUNTER — OFFICE VISIT (OUTPATIENT)
Dept: PHYSICAL THERAPY | Age: 64
End: 2024-05-15
Attending: FAMILY MEDICINE

## 2024-05-15 PROCEDURE — 97110 THERAPEUTIC EXERCISES: CPT

## 2024-05-15 NOTE — PROGRESS NOTES
Diagnosis:   Shoulder impingement syndrome, left (M75.42)    Referring Provider: Austin Wallace  Date of Evaluation:    4/24/24    Precautions:  None Next MD visit:   none scheduled  Date of Surgery: n/a   Insurance Primary/Secondary: [place] / N/A     # Auth Visits: 5 [facility] [place]            QuickDASH Outcome Score  Score: 61.36 % (4/21/2024 10:52 AM)     Subjective: Pt reports feeling more movement in his shoulder. Still has pain and limitation but can see improvement.     Pain: 0/10      Assessment: Pt presents with further improvement in L shoulder AROM this session. Continued with progressive mobility and strengthening.     Objective:   AROM: (* denotes performed with pain)  Pre-tx (post-tx)  Shoulder  (standing)   Flexion: R 180; L 121  Abduction: R 180; L 106   ER: R T2; L C4 ; 50deg abd  IR: R T11; L L5           Goals:   Goals: (to be met in 8 visits)   Pt will verbalize and demo compliance iw home program at least 75% of the time for indepenent management of symptoms on d/c - in progress  Pt will demo improved L Shoulder AROM equal that of R for improved ease of reaching - in progress  Pt will demo improved LUE strength at least 1 muscle grade for all deficit motions for improve stability and decreased cervical compensation - in progress  Pt will demo improved posture awareness requiring cueing <25% of the time for improved scapulohumeral mechanics - in progress    Plan: Continue to address L shoulder ROM restrictions with added work on scapular stabilization  Date: 5/8/2024  TX#: 2/5 Date:                 TX#: 3/ Date:                 TX#: 4/ Date:                 TX#: 5/ Date:   Tx#: 6/   therEx: 30mins  UBE lvl 2.0 3mins fwd/3mins bwd  Supine PROM  L shoulder all planes  Supine shoulder flex AAROM with cane [id number]  Wall slides 10x  ER AAROM with cane 10x  Doorway pec stretch 10x  Shoulder IR up back with green strap 10x  SHAHRIAR YellowTB loop [id number]  Standing rows redTT 20x  Standing low rows yellowTT 20x  therEx:38mins  Ube lvl 3 3mins fwd/3mins bwd  Supine PROm L shoulder all planes  Supine Er butterfly stretch 10x 5sec  Shoulder IR with belt [id number] L  Doorway pec stretch low - stopped secondary to pain  Standing SHAHRIAR greenTB [id number]  Standing rows 15# handles 20x  Standing low rows 15# 20x  Standing ER 5# 10# L  Standing IR 5# 10x L  Standing wall slides flexion 10x L  Standing wall slides abd 10x L      Manual: 10mins  Supine: GHJ mobs grade III, Subscap release,  Lsidely: STM L infraspinatus, teres min/kandy, lat Manual: 2mins  Supine GHJ mobilizations grade III                    HEP:   4/24: wall slides flex L, doorway pec stretch low, shoulder IR with towel assist AAROM L, seated scap retract   5/8: shoulder IR up back with towel, SHAHRIAR yellowTB loop, shoulder ER  5/15: GTB SHAHRIAR, shoulder abd slides    Charges: TherEx x3 Total Timed Treatment: 40 min  Total Treatment Time: 40 min

## 2024-05-22 ENCOUNTER — OFFICE VISIT (OUTPATIENT)
Dept: PHYSICAL THERAPY | Age: 64
End: 2024-05-22
Attending: FAMILY MEDICINE

## 2024-05-22 PROCEDURE — 97140 MANUAL THERAPY 1/> REGIONS: CPT

## 2024-05-22 PROCEDURE — 97110 THERAPEUTIC EXERCISES: CPT

## 2024-05-22 NOTE — PROGRESS NOTES
Diagnosis:   Shoulder impingement syndrome, left (M75.42)    Referring Provider: Austin Wallace  Date of Evaluation:    4/24/24    Precautions:  None Next MD visit:   none scheduled  Date of Surgery: n/a   Insurance Primary/Secondary: [place] / N/A     # [place] 5 [facility] [place]            QuickDASH Outcome Score  Score: 61.36 % (4/21/2024 10:52 AM)     Subjective: Pt reports shoulder is moving better. Still feels tight but can go farther with reaching.    Pain: 0/10      Assessment: Pt presents with improved A/PROM on visual observation. Continued with progressive mobility and strengthening. Improved posture awareness with decreased cueing required for positioning.     Objective:   AROM: (* denotes performed with pain)  Pre-tx (post-tx) 5/15  Shoulder  (standing)   Flexion: R 180; L 121  Abduction: R 180; L 106   ER: R T2; L C4 ; 50deg abd  IR: R T11; L L5           Goals:   Goals: (to be met in 8 visits)   Pt will verbalize and demo compliance iw home program at least 75% of the time for indepenent management of symptoms on d/c - in progress  Pt will demo improved L Shoulder AROM equal that of R for improved ease of reaching - in progress  Pt will demo improved LUE strength at least 1 muscle grade for all deficit motions for improve stability and decreased cervical compensation - in progress  Pt will demo improved posture awareness requiring cueing <25% of the time for improved scapulohumeral mechanics - in progress    Plan: Continue to address L shoulder ROM restrictions with added work on scapular stabilization. Reassess and PN for continuation  Date: 5/8/2024  TX#: 2/5 Date:  5/15/24             TX#: 3/5 Date:   5/22/24              TX#: 4/5 Date:                 TX#: 5/ Date:   Tx#: 6/   therEx: 30mins  UBE lvl 2.0 3mins fwd/3mins bwd  Supine PROM  L shoulder all planes  Supine shoulder flex AAROM with cane [id number]  Wall slides 10x  ER AAROM with cane 10x  Doorway pec stretch 10x  Shoulder IR up back with green  strap 10x  SHAHRIAR YellowTB loop [id number]  Standing rows redTT 20x  Standing low rows yellowTT 20x therEx:38mins  Ube lvl 3 3mins fwd/3mins bwd  Supine PROm L shoulder all planes  Supine Er butterfly stretch 10x 5sec  Shoulder IR with belt [id number] L  Doorway pec stretch low - stopped secondary to pain  Standing SHAHRIAR greenTB [id number]  Standing rows 15# handles 20x  Standing low rows 15# 20x  Standing ER 5# 10# L  Standing IR 5# 10x L  Standing wall slides flexion 10x L  Standing wall slides abd 10x L TherEx: 30mins  UBE lvl 3 3mins fwd/3mins bwd  Supien PROM all planes L shoulder  Standing wall slides flex/abd 10x ea 5sec hold L  Er stretch at doorway 10x L  IR stretch strap 10x L  Wall push up 20x  Standing rows 20# handles 20x  Standing low rows 20# handles 20x  Standing ER 5# 20x L  Standing IR 5# 20x L  Standing horiz abd greenTB 10x alternating R/L  Standing SHAHRIAR GreenTB 10x     Manual: 10mins  Supine: GHJ mobs grade III, Subscap release,  Lsidely: STM L infraspinatus, teres min/kandy, lat Manual: 2mins  Supine GHJ mobilizations grade III Manual: 10mins  Supine: GHJ mobilizations post and inf glides, pec release L  Rsidely: STM L post RTC                    HEP:   4/24: wall slides flex L, doorway pec stretch low, shoulder IR with towel assist AAROM L, seated scap retract   5/8: shoulder IR up back with towel, SHAHRIAR yellowTB loop, shoulder ER  5/15: GTB SHAHRIAR, shoulder abd slides  5/22: may utilize home bands for rows and low rows    Charges: TherEx x2, Man x1 Total Timed Treatment: 40 min  Total Treatment Time: 40 min

## 2024-05-29 ENCOUNTER — OFFICE VISIT (OUTPATIENT)
Dept: PHYSICAL THERAPY | Age: 64
End: 2024-05-29
Attending: FAMILY MEDICINE

## 2024-05-29 PROCEDURE — 97110 THERAPEUTIC EXERCISES: CPT

## 2024-05-29 PROCEDURE — 97140 MANUAL THERAPY 1/> REGIONS: CPT

## 2024-05-29 NOTE — PROGRESS NOTES
Progress Summary  Pt has attended 5 visits in Physical Therapy.     Diagnosis:   Shoulder impingement syndrome, left (M75.42)    Referring Provider: Austin Wallace  Date of Evaluation:    4/24/24    Precautions:  None Next MD visit:   none scheduled  Date of Surgery: n/a   Insurance Primary/Secondary: [place] / N/A     # Auth Visits: 5 [facility] [place]            QuickDASH Outcome Score  Score: 61.36 % (4/21/2024 10:52 AM)     Subjective: Pt reports shoulder has been good. No pain currently. Improving slowly. Has been doing HEP as instructed    Pain: 0/10      Assessment: Vi has been seen for 5/5 approved sessions in PT. He has been compliant with attendance and HEP performance. He presents with improved L shoulder AROM. Continued restrictions and pain at end range motions. Improved L shoulder strength within available range. Remaining deficits to scapular strength although improved. At this time, pt is improving well towards goals. Recommend continued skilled PT to further improve mobility and strength, eliminate pain and return to PLOF.    Objective:     Observation/Posture: fwd head, rounded shoulders,  Palpation: moderate soft tissue restrictions at post RTC L  Sensation: intact to light touch  Cervical Screen:   Flex: WNL  Ext: WNL  SB: WNL  Rot: WNL        AROM: (* denotes performed with pain)  Shoulder  Elbow   Flexion: R 180; L 122  Abduction: R 180; L 121  ER: R T2; L C7; 52deg 45deg abd  IR: R T11; L L3 WFL      PROM: (* denotes performed with pain)  Shoulder    Flexion: R 180; L 144  Abduction: R 180; L180  ER: L: ; 50deg 45deg abd  IR:  L 80 at 45deg abd      Accessory motion: min dec L GHJ post and inf glide        Strength/MMT: (* denotes performed with pain)  Shoulder Elbow Scapular   Flexion: R 5/5; L 4/5  Abduction: R 5/5; L 4/5  ER: R 5/5; L 4/5  IR: R 5/5; L 4/5   Flexion: R 5/5; L 5/5  Extension: R 5/5; L 5/5 Rhomboids: R5/5, L 4-/5  Mid trap: R 5/5; L 4-/5   Low trap: R 4/5; L 2/5        Goals:   Goals:  (to be met in 8 visits)   Pt will verbalize and demo compliance [facility] home program at least 75% of the time for indepenent management of symptoms on d/c - pt compliant  Pt will demo improved L Shoulder AROM equal that of R for improved ease of reaching - improving  Pt will demo improved LUE strength at least 1 muscle grade for all deficit motions for improve stability and decreased cervical compensation - improving  Pt will demo improved posture awareness requiring cueing <25% of the time for improved scapulohumeral mechanics - improving      Plan: Continue skilled Physical Therapy 1 x/week or a total of 6-8 visits over a 90 day period. Treatment will include: therapeutic exercises, therapeutic activities,        Patient/Family/Caregiver was advised of these findings, precautions, and treatment options and has agreed to actively participate in planning and for this course of care.    Thank you for your referral. If you have any questions, please contact me at Dept: [phone].    Sincerely,  Electronically signed by therapist: Stefanie Glynn, PT , DPT,BSPTS-C2    Physician's certification required:  Yes  Please co-sign or sign and return this letter via fax as soon as possible to [phone].   I certify the need for these services furnished under this plan of treatment and while under my care.    X___________________________________________________ Date____________________    Certification From: 5/29/2024  To:8/27/2024       Plan: Assess effect of DN. May continue as indicated. Progress mobility and strengthening as tolerated  Date: 5/8/2024  TX#: 2/5 Date:  5/15/24             TX#: 3/5 Date:   5/22/24              TX#: 4/5 Date:   5/29/24        TX#: 5/5 Date:   Tx#: 6/   therEx: 30mins  UBE lvl 2.0 3mins fwd/3mins bwd  Supine PROM  L shoulder all planes  Supine shoulder flex AAROM with cane [id number]  Wall slides 10x  ER AAROM with cane 10x  Doorway pec stretch 10x  Shoulder IR up back with green strap 10x  SHAHRIAR  YellowTB loop [id number]  Standing rows redTT 20x  Standing low rows yellowTT 20x therEx:38mins  Ube lvl 3 3mins fwd/3mins bwd  Supine PROm L shoulder all planes  Supine Er butterfly stretch 10x 5sec  Shoulder IR with belt [id number] L  Doorway pec stretch low - stopped secondary to pain  Standing SHAHRIAR greenTB [id number]  Standing rows 15# handles 20x  Standing low rows 15# 20x  Standing ER 5# 10# L  Standing IR 5# 10x L  Standing wall slides flexion 10x L  Standing wall slides abd 10x L TherEx: 30mins  UBE lvl 3 3mins fwd/3mins bwd  Supien PROM all planes L shoulder  Standing wall slides flex/abd 10x ea 5sec hold L  Er stretch at doorway 10x L  IR stretch strap 10x L  Wall push up 20x  Standing rows 20# handles 20x  Standing low rows 20# handles 20x  Standing ER 5# 20x L  Standing IR 5# 20x L  Standing horiz abd greenTB 10x alternating R/L  Standing SHAHRIAR GreenTB 10x therEx:32mins  Reassessment testing  UBE lvl 4 3mins fwd/3mins bwd  Supine PROM all planes  Standing wall slides 10x ea flex/abd    Manual: 10mins  Supine: GHJ mobs grade III, Subscap release,  Lsidely: STM L infraspinatus, teres min/kandy, lat Manual: 2mins  Supine GHJ mobilizations grade III Manual: 10mins  Supine: GHJ mobilizations post and inf glides, pec release L  Rsidely: STM L post RTC  Manual: 13mins  Supine GHJ mobilizations post and inf glides  Assessment of taut bands and TPs for use with DN at L middle deltoid; DN of 3 TPs in L middle delt with pre and post assessment - no prec/contra and no adverse reactions post                  HEP:   4/24: wall slides flex L, doorway pec stretch low, shoulder IR with towel assist AAROM L, seated scap retract   5/8: shoulder IR up back with towel, SHAHRIAR yellowTB loop, shoulder ER  5/15: GTB SHAHRIAR, shoulder abd slides  5/22: may utilize home bands for rows and low rows    Charges: TherEx x2, Man x1 Total Timed Treatment: 45 min  Total Treatment Time: 45 min

## 2024-06-05 ENCOUNTER — OFFICE VISIT (OUTPATIENT)
Dept: PHYSICAL THERAPY | Age: 64
End: 2024-06-05
Attending: FAMILY MEDICINE
Payer: COMMERCIAL

## 2024-06-05 PROCEDURE — 97140 MANUAL THERAPY 1/> REGIONS: CPT

## 2024-06-05 PROCEDURE — 97110 THERAPEUTIC EXERCISES: CPT

## 2024-06-05 NOTE — PROGRESS NOTES
Diagnosis:   Shoulder impingement syndrome, left (M75.42)    Referring Provider: Austin Wallace  Date of Evaluation:    4/24/24    Precautions:  None Next MD visit:   none scheduled  Date of Surgery: n/a   Insurance Primary/Secondary: [employer] [place] / N/A     # Auth Visits: 10 O exp 7/31     QuickDASH Outcome Score  Score: 61.36 % (4/21/2024 10:52 AM)     Subjective: Pt reports shoulder feeling slowly better. Felt improvement after needling last session.     Pain: 0/10      Assessment: Continued with DN to L deltoid. Continued with stretching to L shoulder. Pt with continued steady improvement wiht L shoulder ROM and function.    Objective:   Not measured this session  Observation/Posture: fwd head, rounded shoulders,  Palpation: moderate soft tissue restrictions at post RTC L  Sensation: intact to light touch  Cervical Screen:   Flex: WNL  Ext: WNL  SB: WNL  Rot: WNL        AROM: (* denotes performed with pain)  Shoulder  Elbow   Flexion: R 180; L 122  Abduction: R 180; L 121  ER: R T2; L C7; 52deg 45deg abd  IR: R T11; L L3 WFL      PROM: (* denotes performed with pain)  Shoulder    Flexion: R 180; L 144  Abduction: R 180; L180  ER: L: ; 50deg 45deg abd  IR:  L 80 at 45deg abd      Accessory motion: min dec L GHJ post and inf glide        Strength/MMT: (* denotes performed with pain)  Shoulder Elbow Scapular   Flexion: R 5/5; L 4/5  Abduction: R 5/5; L 4/5  ER: R 5/5; L 4/5  IR: R 5/5; L 4/5   Flexion: R 5/5; L 5/5  Extension: R 5/5; L 5/5 Rhomboids: R5/5, L 4-/5  Mid trap: R 5/5; L 4-/5   Low trap: R 4/5; L 2/5        Goals:   Goals: (to be met in 8 visits)   Pt will verbalize and demo compliance iw home program at least 75% of the time for indepenent management of symptoms on d/c - pt compliant  Pt will demo improved L Shoulder AROM equal that of R for improved ease of reaching - improving  Pt will demo improved LUE strength at least 1 muscle grade for all deficit motions for improve stability and decreased  cervical compensation - improving  Pt will demo improved posture awareness requiring cueing <25% of the time for improved scapulohumeral mechanics - improving    Plan: Assess effect of DN. May continue as indicated. Progress mobility and strengthening as tolerated  Date: 5/8/2024  TX#: 2/5 Date:  5/15/24             TX#: 3/5 Date:   5/22/24              TX#: 4/5 Date:   5/29/24        TX#: 5/5 Date: 6/5/24  Tx#: 6/10   therEx: 30mins  UBE lvl 2.0 3mins fwd/3mins bwd  Supine PROM  L shoulder all planes  Supine shoulder flex AAROM with cane [id number]  Wall slides 10x  ER AAROM with cane 10x  Doorway pec stretch 10x  Shoulder IR up back with green strap 10x  SHAHRIAR YellowTB loop [id number]  Standing rows redTT 20x  Standing low rows yellowTT 20x therEx:38mins  Ube lvl 3 3mins fwd/3mins bwd  Supine PROm L shoulder all planes  Supine Er butterfly stretch 10x 5sec  Shoulder IR with belt [id number] L  Doorway pec stretch low - stopped secondary to pain  Standing SHAHRIAR greenTB [id number]  Standing rows 15# handles 20x  Standing low rows 15# 20x  Standing ER 5# 10# L  Standing IR 5# 10x L  Standing wall slides flexion 10x L  Standing wall slides abd 10x L TherEx: 30mins  UBE lvl 3 3mins fwd/3mins bwd  Supien PROM all planes L shoulder  Standing wall slides flex/abd 10x ea 5sec hold L  Er stretch at doorway 10x L  IR stretch strap 10x L  Wall push up 20x  Standing rows 20# handles 20x  Standing low rows 20# handles 20x  Standing ER 5# 20x L  Standing IR 5# 20x L  Standing horiz abd greenTB 10x alternating R/L  Standing SHAHRIAR GreenTB 10x therEx:32mins  Reassessment testing  UBE lvl 4 3mins fwd/3mins bwd  Supine PROM all planes  Standing wall slides 10x ea flex/abd Therex: 29mins  UBE lvl [id number] fwd, 3mins bwd  Supine PROM all planes LUE  Wall slides flex/abd 10x ea R/L  Bent over mid trap -stopped secondary to deltoid compensation  Bent over sholder extension 3# 20x  SHAHRIAR blackTB 20x  Standing rows 20# 20x  Standing low rows 20# 20x   Manual:  10mins  Supine: GHJ mobs grade III, Subscap release,  Lsidely: STM L infraspinatus, teres min/kandy, lat Manual: 2mins  Supine GHJ mobilizations grade III Manual: 10mins  Supine: GHJ mobilizations post and inf glides, pec release L  Rsidely: STM L post RTC  Manual: 13mins  Supine GHJ mobilizations post and inf glides  Assessment of taut bands and TPs for use with DN at L middle deltoid; DN of 3 TPs in L middle delt with pre and post assessment - no prec/contra and no adverse reactions post Manual: 9mins  Assessment of taut bands and TPs for use with DN at L anterior deltoid 30mm needle, 2 TPs. Symptoms assessed pre-and post without adverse reactions post                 HEP:   4/24: wall slides flex L, doorway pec stretch low, shoulder IR with towel assist AAROM L, seated scap retract   5/8: shoulder IR up back with towel, SHAHRIAR yellowTB loop, shoulder ER  5/15: GTB SHAHRIAR, shoulder abd slides  5/22: may utilize home bands for rows and low rows    Charges: TherEx x2, Man x1 Total Timed Treatment: 38 min  Total Treatment Time: 38 min

## 2024-06-12 ENCOUNTER — OFFICE VISIT (OUTPATIENT)
Dept: PHYSICAL THERAPY | Age: 64
End: 2024-06-12
Attending: FAMILY MEDICINE
Payer: COMMERCIAL

## 2024-06-12 PROCEDURE — 97140 MANUAL THERAPY 1/> REGIONS: CPT

## 2024-06-12 PROCEDURE — 97110 THERAPEUTIC EXERCISES: CPT

## 2024-06-12 NOTE — PROGRESS NOTES
Diagnosis:   Shoulder impingement syndrome, left (M75.42)    Referring Provider: Austin Wallace  Date of Evaluation:    4/24/24    Precautions:  None Next MD visit:   none scheduled  Date of Surgery: n/a   Insurance Primary/Secondary: [employer] / N/A     # Auth Visits: 10 HMO exp 7/31     QuickDASH Outcome Score  Score: 61.36 % (4/21/2024 10:52 AM)     Subjective: Pt reports continued improvement in his shoulder. Has been doing his exercises. Still some pain in his shoulder but not as bad.    Pain: 0/10      Assessment: Continued wiht stretching/mobilization to L shoulder. Improved ease of end range PROM. Progressed scapular stability. Pt with improved strength and stability.    Objective:   Not measured this session  Observation/Posture: fwd head, rounded shoulders,  Palpation: moderate soft tissue restrictions at post RTC L  Sensation: intact to light touch  Cervical Screen:   Flex: WNL  Ext: WNL  SB: WNL  Rot: WNL        AROM: (* denotes performed with pain)  Shoulder  Elbow   Flexion: R 180; L 122  Abduction: R 180; L 121  ER: R T2; L C7; 52deg 45deg abd  IR: R T11; L L3 WFL      PROM: (* denotes performed with pain)  Shoulder    Flexion: R 180; L 144  Abduction: R 180; L180  ER: L: ; 50deg 45deg abd  IR:  L 80 at 45deg abd      Accessory motion: min dec L GHJ post and inf glide        Strength/MMT: (* denotes performed with pain)  Shoulder Elbow Scapular   Flexion: R 5/5; L 4/5  Abduction: R 5/5; L 4/5  ER: R 5/5; L 4/5  IR: R 5/5; L 4/5   Flexion: R 5/5; L 5/5  Extension: R 5/5; L 5/5 Rhomboids: R5/5, L 4-/5  Mid trap: R 5/5; L 4-/5   Low trap: R 4/5; L 2/5        Goals:   Goals: (to be met in 8 visits)   Pt will verbalize and demo compliance iw home program at least 75% of the time for indepenent management of symptoms on d/c - pt compliant  Pt will demo improved L Shoulder AROM equal that of R for improved ease of reaching - improving  Pt will demo improved LUE strength at least 1 muscle grade for all  deficit motions for improve stability and decreased cervical compensation - improving  Pt will demo improved posture awareness requiring cueing <25% of the time for improved scapulohumeral mechanics - improving    Plan: Assess effect of DN. May continue as indicated. Progress mobility and strengthening as tolerated  Date: 6/12/2024  TX#: 7/10 Date:  5/15/24             TX#: 3/5 Date:   5/22/24              TX#: 4/5 Date:   5/29/24        TX#: 5/5 Date: 6/5/24  Tx#: 6/10   therEx: 30 mins  UBE lvl 4.0 3mins fwd/3mins bwd  Supine PROM L shoulder all planes  Wall slides flexion 10x  Wall slides abduction 10x  Shoulder IR with strap  Wall walk lateral 10x R/L redTB loop   Standing rows cable 25# 20x  Standing low rows 15# cable 20x  Standing horiz abd 5# 20x L   therEx:38mins  Ube lvl 3 3mins fwd/3mins bwd  Supine PROm L shoulder all planes  Supine Er butterfly stretch 10x 5sec  Shoulder IR with belt [id number] L  Doorway pec stretch low - stopped secondary to pain  Standing SHAHRIAR greenTB [id number]  Standing rows 15# handles 20x  Standing low rows 15# 20x  Standing ER 5# 10# L  Standing IR 5# 10x L  Standing wall slides flexion 10x L  Standing wall slides abd 10x L TherEx: 30mins  UBE lvl 3 3mins fwd/3mins bwd  Supien PROM all planes L shoulder  Standing wall slides flex/abd 10x ea 5sec hold L  Er stretch at doorway 10x L  IR stretch strap 10x L  Wall push up 20x  Standing rows 20# handles 20x  Standing low rows 20# handles 20x  Standing ER 5# 20x L  Standing IR 5# 20x L  Standing horiz abd greenTB 10x alternating R/L  Standing SHAHRIAR GreenTB 10x therEx:32mins  Reassessment testing  UBE lvl 4 3mins fwd/3mins bwd  Supine PROM all planes  Standing wall slides 10x ea flex/abd Therex: 29mins  UBE lvl [id number] fwd, 3mins bwd  Supine PROM all planes LUE  Wall slides flex/abd 10x ea R/L  Bent over mid trap -stopped secondary to deltoid compensation  Bent over sholder extension 3# 20x  SHAHRIAR blackTB 20x  Standing rows 20# 20x  Standing low  rows 20# 20x   Manual: 9mins  Supine: GHJ mobs grade III, Subscap release,  Lsidely: STM L infraspinatus, teres min/kandy, lat Manual: 2mins  Supine GHJ mobilizations grade III Manual: 10mins  Supine: GHJ mobilizations post and inf glides, pec release L  Rsidely: STM L post RTC  Manual: 13mins  Supine GHJ mobilizations post and inf glides  Assessment of taut bands and TPs for use with DN at L middle deltoid; DN of 3 TPs in L middle delt with pre and post assessment - no prec/contra and no adverse reactions post Manual: 9mins  Assessment of taut bands and TPs for use with DN at L anterior deltoid 30mm needle, 2 TPs. Symptoms assessed pre-and post without adverse reactions post                 HEP:   4/24: wall slides flex L, doorway pec stretch low, shoulder IR with towel assist AAROM L, seated scap retract   5/8: shoulder IR up back with towel, SHAHRIAR yellowTB loop, shoulder ER  5/15: GTB SHAHRIAR, shoulder abd slides  5/22: may utilize home bands for rows and low rows  6/12: standing horiz abd wall walk lateral redTB loop    Charges: TherEx x2, Man x1 Total Timed Treatment: 39 min  Total Treatment Time: 40 min

## 2024-06-19 ENCOUNTER — TELEPHONE (OUTPATIENT)
Dept: PHYSICAL THERAPY | Age: 64
End: 2024-06-19

## 2024-06-19 ENCOUNTER — APPOINTMENT (OUTPATIENT)
Dept: PHYSICAL THERAPY | Age: 64
End: 2024-06-19
Attending: FAMILY MEDICINE
Payer: COMMERCIAL

## 2024-06-19 ENCOUNTER — PATIENT MESSAGE (OUTPATIENT)
Dept: FAMILY MEDICINE CLINIC | Facility: CLINIC | Age: 64
End: 2024-06-19

## 2024-06-19 RX ORDER — PROPRANOLOL HYDROCHLORIDE 60 MG/1
60 TABLET ORAL 3 TIMES DAILY
COMMUNITY
End: 2024-06-19 | Stop reason: CLARIF

## 2024-06-19 RX ORDER — PROPRANOLOL HCL 60 MG
60 CAPSULE, EXTENDED RELEASE 24HR ORAL DAILY
Qty: 90 CAPSULE | Refills: 3 | Status: SHIPPED | OUTPATIENT
Start: 2024-06-19

## 2024-06-19 NOTE — TELEPHONE ENCOUNTER
From: Jeanne Arnold  To: Austin Wallace  Sent: 6/19/2024 8:51 AM CDT  Subject: Refill Propranolol HCL ER CAP 60mg    Dr Wallace,  I am out of Propranolo HCL ER CAP 60mg and this medication is not on the Medications list in [facility] to request a refill. Please fill this medication ASAP to [facility] mail order. Thanks!

## 2024-06-26 ENCOUNTER — APPOINTMENT (OUTPATIENT)
Dept: PHYSICAL THERAPY | Age: 64
End: 2024-06-26
Attending: FAMILY MEDICINE
Payer: COMMERCIAL

## 2024-07-03 ENCOUNTER — OFFICE VISIT (OUTPATIENT)
Dept: PHYSICAL THERAPY | Age: 64
End: 2024-07-03
Attending: FAMILY MEDICINE
Payer: COMMERCIAL

## 2024-07-03 PROCEDURE — 97110 THERAPEUTIC EXERCISES: CPT

## 2024-07-03 NOTE — PROGRESS NOTES
Diagnosis:   Shoulder impingement syndrome, left (M75.42)    Referring Provider: Austin Wallace  Date of Evaluation:    4/24/24    Precautions:  None Next MD visit:   none scheduled  Date of Surgery: n/a   Insurance Primary/Secondary: [employer] / N/A     # Auth Visits: 10 HMO exp 7/31     QuickDASH Outcome Score  Score: 61.36 % (4/21/2024 10:52 AM)     Subjective: Pt reports shoulder getting better everyday but still does not feel normal.     Pain: 0/10      Assessment: Further improvement in LUE AROM this session although remaining limitations into end range.    Objective:      AROM: (* denotes performed with pain)  Shoulder  Elbow   Flexion: R 180; L 154  Abduction: R 180; L 165  ER: R T2; L C7; 56deg 45deg abd  IR: R T11; L L3 WFL        Goals:   Goals: (to be met in 8 visits)   Pt will verbalize and demo compliance iw home program at least 75% of the time for indepenent management of symptoms on d/c - pt compliant  Pt will demo improved L Shoulder AROM equal that of R for improved ease of reaching - improving  Pt will demo improved LUE strength at least 1 muscle grade for all deficit motions for improve stability and decreased cervical compensation - improving  Pt will demo improved posture awareness requiring cueing <25% of the time for improved scapulohumeral mechanics - improving    Plan: Continue with mobility and stability work as tolerated. Continue 1-2 remaining sessions then anticipate discharge.  Date: 6/12/2024  TX#: 7/10 Date:  7/1/24             TX#: 8/10 Date:   5/22/24              TX#: 4/5 Date:   5/29/24        TX#: 5/5 Date: 6/5/24  Tx#: 6/10   therEx: 30 mins  UBE lvl 4.0 3mins fwd/3mins bwd  Supine PROM L shoulder all planes  Wall slides flexion 10x  Wall slides abduction 10x  Shoulder IR with strap  Wall walk lateral 10x R/L redTB loop   Standing rows cable 25# 20x  Standing low rows 15# cable 20x  Standing horiz abd 5# 20x L   therEx:38mins  Ube lvl 5 3mins fwd/3mins bwd  Supine PROM all  planes   Standing shoulder flex AAROM 15x L  Standing shoulder IR with strap L 15x  Standing wall walk lateral 10x R/L greenTB  Standing SHAHRIAR greenTB loop [id number]  Standing rows cable 20# 20x  Standing low rows cable 15# 20x  Standing shoulder adduction 15# 20x  Standing IR 15# 20  Standing ER 5# 20x R TherEx: 30mins  UBE lvl 3 3mins fwd/3mins bwd  Supien PROM all planes L shoulder  Standing wall slides flex/abd 10x ea 5sec hold L  Er stretch at doorway 10x L  IR stretch strap 10x L  Wall push up 20x  Standing rows 20# handles 20x  Standing low rows 20# handles 20x  Standing ER 5# 20x L  Standing IR 5# 20x L  Standing horiz abd greenTB 10x alternating R/L  Standing SHAHRIAR GreenTB 10x therEx:32mins  Reassessment testing  UBE lvl 4 3mins fwd/3mins bwd  Supine PROM all planes  Standing wall slides 10x ea flex/abd Therex: 29mins  UBE lvl [id number] fwd, 3mins bwd  Supine PROM all planes LUE  Wall slides flex/abd 10x ea R/L  Bent over mid trap -stopped secondary to deltoid compensation  Bent over sholder extension 3# 20x  SHAHRIAR blackTB 20x  Standing rows 20# 20x  Standing low rows 20# 20x   Manual: 9mins  Supine: GHJ mobs grade III, Subscap release,  Lsidely: STM L infraspinatus, teres min/kandy, lat Manual: 1mins  Supine GHJ mobilizations grade III Manual: 10mins  Supine: GHJ mobilizations post and inf glides, pec release L  Rsidely: STM L post RTC  Manual: 13mins  Supine GHJ mobilizations post and inf glides  Assessment of taut bands and TPs for use with DN at L middle deltoid; DN of 3 TPs in L middle delt with pre and post assessment - no prec/contra and no adverse reactions post Manual: 9mins  Assessment of taut bands and TPs for use with DN at L anterior deltoid 30mm needle, 2 TPs. Symptoms assessed pre-and post without adverse reactions post                 HEP:   4/24: wall slides flex L, doorway pec stretch low, shoulder IR with towel assist AAROM L, seated scap retract   5/8: shoulder IR up back with towel, SHAHRIAR yellowTB  loop, shoulder ER  5/15: GTB SHAHRIAR, shoulder abd slides  5/22: may utilize home bands for rows and low rows  6/12: standing horiz abd wall walk lateral redTB loop      Charges: TherEx x3 Total Timed Treatment: 39 min  Total Treatment Time: 39 min

## 2024-07-10 ENCOUNTER — OFFICE VISIT (OUTPATIENT)
Dept: PHYSICAL THERAPY | Age: 64
End: 2024-07-10
Attending: FAMILY MEDICINE
Payer: COMMERCIAL

## 2024-07-10 PROCEDURE — 97110 THERAPEUTIC EXERCISES: CPT

## 2024-07-10 NOTE — PROGRESS NOTES
Diagnosis:   Shoulder impingement syndrome, left (M75.42)    Referring Provider: Austin Wallace  Date of Evaluation:    4/24/24    Precautions:  None Next MD visit:   none scheduled  Date of Surgery: n/a   Insurance Primary/Secondary: [employer] / N/A     # Auth Visits: 10 HMO exp 7/31     QuickDASH Outcome Score  Score: 61.36 % (4/21/2024 10:52 AM)     Subjective: Pt reports continued improvement in shoulder. Minimal pain.    Pain: 0/10      Assessment: progressed strengthening/stability work. Pt with improved control and endurance with exercises.    Objective:    not measured this session  AROM: (* denotes performed with pain)  Shoulder  Elbow   Flexion: R 180; L 154  Abduction: R 180; L 165  ER: R T2; L C7; 56deg 45deg abd  IR: R T11; L L3 WFL        Goals:   Goals: (to be met in 8 visits)   Pt will verbalize and demo compliance iw home program at least 75% of the time for indepenent management of symptoms on d/c - pt compliant  Pt will demo improved L Shoulder AROM equal that of R for improved ease of reaching - improving  Pt will demo improved LUE strength at least 1 muscle grade for all deficit motions for improve stability and decreased cervical compensation - improving  Pt will demo improved posture awareness requiring cueing <25% of the time for improved scapulohumeral mechanics - improving    Plan: reassess and anticipate discharge  Date: 6/12/2024  TX#: 7/10 Date:  7/1/24             TX#: 8/10 Date:   7/10/24              TX#: 9/19 Date:   5/29/24        TX#: 5/5 Date: 6/5/24  Tx#: 6/10   therEx: 30 mins  UBE lvl 4.0 3mins fwd/3mins bwd  Supine PROM L shoulder all planes  Wall slides flexion 10x  Wall slides abduction 10x  Shoulder IR with strap  Wall walk lateral 10x R/L redTB loop   Standing rows cable 25# 20x  Standing low rows 15# cable 20x  Standing horiz abd 5# 20x L   therEx:38mins  Ube lvl 5 3mins fwd/3mins bwd  Supine PROM all planes   Standing shoulder flex AAROM 15x L  Standing shoulder IR  with strap L 15x  Standing wall walk lateral 10x R/L greenTB  Standing SHAHRIAR greenTB loop [id number]  Standing rows cable 20# 20x  Standing low rows cable 15# 20x  Standing shoulder adduction 15# 20x  Standing IR 15# 20  Standing ER 5# 20x R TherEx: 39mins  UBE lvl 5 3mins fwd/3mins bwd  Supien PROM all planes L shoulder  Standing AAROM shoulder flex/abd slides 10x ea  Standing shoulder IR with strap 10x R  Wall clock greenTB 3 positions 10x R/L  Serratus lateral slide greenTB [id number]  Standing rows 20# handles 20x  Standing low rows 20# handles 20x  Standing ER 5# 20x L  Standing IR 5# 20x L therEx:32mins  Reassessment testing  UBE lvl 4 3mins fwd/3mins bwd  Supine PROM all planes  Standing wall slides 10x ea flex/abd Therex: 29mins  UBE lvl [id number] fwd, 3mins bwd  Supine PROM all planes LUE  Wall slides flex/abd 10x ea R/L  Bent over mid trap -stopped secondary to deltoid compensation  Bent over sholder extension 3# 20x  SHAHRIAR blackTB 20x  Standing rows 20# 20x  Standing low rows 20# 20x   Manual: 9mins  Supine: GHJ mobs grade III, Subscap release,  Lsidely: STM L infraspinatus, teres min/kandy, lat Manual: 1mins  Supine GHJ mobilizations grade III Manual: 2mins  Supine GHJ mobs grade IV post and inf Manual: 13mins  Supine GHJ mobilizations post and inf glides  Assessment of taut bands and TPs for use with DN at L middle deltoid; DN of 3 TPs in L middle delt with pre and post assessment - no prec/contra and no adverse reactions post Manual: 9mins  Assessment of taut bands and TPs for use with DN at L anterior deltoid 30mm needle, 2 TPs. Symptoms assessed pre-and post without adverse reactions post                 HEP:   4/24: wall slides flex L, doorway pec stretch low, shoulder IR with towel assist AAROM L, seated scap retract   5/8: shoulder IR up back with towel, SHAHRIAR yellowTB loop, shoulder ER  5/15: GTB SHAHRIAR, shoulder abd slides  5/22: may utilize home bands for rows and low rows  6/12: standing horiz abd wall walk  lateral redTB loop  7/10: wall clocks greenTB  Charges: TherEx x3 Total Timed Treatment: 41 min  Total Treatment Time: 41 min

## 2024-07-21 DIAGNOSIS — E11.9 TYPE 2 DIABETES MELLITUS WITHOUT COMPLICATION, WITHOUT LONG-TERM CURRENT USE OF INSULIN (HCC): ICD-10-CM

## 2024-07-23 PROBLEM — Z86.0101 HISTORY OF ADENOMATOUS POLYP OF COLON: Status: ACTIVE | Noted: 2024-07-23

## 2024-07-23 PROBLEM — D12.3 BENIGN NEOPLASM OF TRANSVERSE COLON: Status: ACTIVE | Noted: 2024-07-23

## 2024-07-23 PROBLEM — Z86.010 HISTORY OF ADENOMATOUS POLYP OF COLON: Status: ACTIVE | Noted: 2024-07-23

## 2024-07-23 PROBLEM — D12.5 BENIGN NEOPLASM OF SIGMOID COLON: Status: ACTIVE | Noted: 2024-07-23

## 2024-07-23 PROCEDURE — 88305 TISSUE EXAM BY PATHOLOGIST: CPT | Performed by: INTERNAL MEDICINE

## 2024-07-29 ENCOUNTER — OFFICE VISIT (OUTPATIENT)
Dept: PHYSICAL THERAPY | Age: 64
End: 2024-07-29
Attending: FAMILY MEDICINE
Payer: COMMERCIAL

## 2024-07-29 PROCEDURE — 97110 THERAPEUTIC EXERCISES: CPT

## 2024-07-29 NOTE — PROGRESS NOTES
Discharge Summary  Pt has attended 10 visits in Physical Therapy.     Diagnosis:   Shoulder impingement syndrome, left (M75.42)    Referring Provider: Austin Wallace  Date of Evaluation:    4/24/24    Precautions:  None Next MD visit:   none scheduled  Date of Surgery: n/a   Insurance Primary/Secondary: [employer] [place] / N/A     # Auth Visits: 10 HMO exp 7/31     QuickDASH Outcome Score  Score: 61.36 % (4/21/2024 10:52 AM)     Subjective: Pt reports some continued discomfort in his shoulder, but is getting better. Wakes up with some discomfort in shoulders which lasts about 10mins then pain goes away. Exercises continue to help. Feels ready for discharge.     Pain: 0/10      Assessment: Vi has been seen for 10 sessions in PT. He has been compliant with attendance and HEP performance. He presents with significant improvement in LUE AROM. Mild deficits remain although this should continue to improve wiht continued consistent performance of HEP. Strength now WNL and pain free on resisted testing. At this time, pt has progressed well towards goals. Recommend discharge to home program. Advised pt to call with any future questions or concerns. Pt in agreement with plan.     Objective:   Observation/Posture: good posture  Palpation: min restrictions at post RTC  Sensation: intact to light touch  Cervical Screen:   Flex: WNL  Ext: WNL  SB: WNL  Rot: WNL        AROM: (* denotes performed with pain)  Shoulder  Elbow   Flexion: R 180; L 156  Abduction: R 180; L 170  ER: R T2; L C7; 72deg 45deg abd  IR: R T11; L L2 WFL       Accessory motion: min dec L GHJ post and inf glide        Strength/MMT: (* denotes performed with pain)  Shoulder Elbow Scapular   Flexion: R 5/5; L 4+5  Abduction: R 5/5; L 4/5  ER: R 5/5; L 4/5  IR: R 5/5; L 5/5    Flexion: R 5/5; L 5/5  Extension: R 5/5; L 5/5 Rhomboids: R5/5, L 4/5  Mid trap: R 5/5; L 4/5   Low trap: R 4/5; L 4/5         Goals:   Goals: (to be met in 8 visits)   Pt will verbalize and demo  compliance [facility] home program at least 75% of the time for indepenent management of symptoms on d/c - pt compliant  Pt will demo improved L Shoulder AROM equal that of R for improved ease of reaching - partially met  Pt will demo improved LUE strength at least 1 muscle grade for all deficit motions for improve stability and decreased cervical compensation - met  Pt will demo improved posture awareness requiring cueing <25% of the time for improved scapulohumeral mechanics - met    Post QuickDASH Outcome Score  Post Score: 2.27 % (7/29/2024  4:15 PM)    59.09 % improvement    Plan: Discharge to home program    Patient was advised of these findings, precautions, and treatment options and has agreed to actively participate in planning and for this course of care.    Thank you for your referral. If you have any questions, please contact me at Dept: [phone].    Sincerely,  Electronically signed by therapist: Stefanie Glynn, PT ,DPT,BSPTS_[id number]    Physician's certification required:  No  Please co-sign or sign and return this letter via fax as soon as possible to [phone].   I certify the need for these services furnished under this plan of treatment and while under my care.    X___________________________________________________ Date____________________    Certification From: 7/29/2024  To:10/27/2024     Date: 6/12/2024  TX#: 7/10 Date:  7/1/24             TX#: 8/10 Date:   7/10/24              TX#: 9/10 Date:   7/29/24        TX#: 10/10 Date: 6/5/24  Tx#: 6/10   therEx: 30 mins  UBE lvl 4.0 3mins fwd/3mins bwd  Supine PROM L shoulder all planes  Wall slides flexion 10x  Wall slides abduction 10x  Shoulder IR with strap  Wall walk lateral 10x R/L redTB loop   Standing rows cable 25# 20x  Standing low rows 15# cable 20x  Standing horiz abd 5# 20x L   therEx:38mins  Ube lvl 5 3mins fwd/3mins bwd  Supine PROM all planes   Standing shoulder flex AAROM 15x L  Standing shoulder IR with strap L 15x  Standing wall walk  lateral 10x R/L greenTB  Standing SHAHRIAR greenTB loop [id number]  Standing rows cable 20# 20x  Standing low rows cable 15# 20x  Standing shoulder adduction 15# 20x  Standing IR 15# 20  Standing ER 5# 20x R TherEx: 39mins  UBE lvl 5 3mins fwd/3mins bwd  Supien PROM all planes L shoulder  Standing AAROM shoulder flex/abd slides 10x ea  Standing shoulder IR with strap 10x R  Wall clock greenTB 3 positions 10x R/L  Serratus lateral slide greenTB [id number]  Standing rows 20# handles 20x  Standing low rows 20# handles 20x  Standing ER 5# 20x L  Standing IR 5# 20x L therEx:32mins  Reassessment testing  UBE lvl 4 3mins fwd/3mins bwd  Supine PROM all planes  Standing wall slides 10x ea flex/abd  Wall clocks greenTB 3 positions 10x R/L  Serratus lateral slide greenTB [id number]  Standing rows 20# 20x  Standing low rows 20# 20x  Standing ER/IR 5# 20x R/L Therex: 29mins  UBE lvl [id number] fwd, 3mins bwd  Supine PROM all planes LUE  Wall slides flex/abd 10x ea R/L  Bent over mid trap -stopped secondary to deltoid compensation  Bent over sholder extension 3# 20x  SHAHRIAR blackTB 20x  Standing rows 20# 20x  Standing low rows 20# 20x   Manual: 9mins  Supine: GHJ mobs grade III, Subscap release,  Lsidely: STM L infraspinatus, teres min/kandy, lat Manual: 1mins  Supine GHJ mobilizations grade III Manual: 2mins  Supine GHJ mobs grade IV post and inf Manual:  Manual: 9mins  Assessment of taut bands and TPs for use with DN at L anterior deltoid 30mm needle, 2 TPs. Symptoms assessed pre-and post without adverse reactions post                 HEP:   4/24: wall slides flex L, doorway pec stretch low, shoulder IR with towel assist AAROM L, seated scap retract   5/8: shoulder IR up back with towel, SHAHRIAR yellowTB loop, shoulder ER  5/15: GTB SHAHRIAR, shoulder abd slides  5/22: may utilize home bands for rows and low rows  6/12: standing horiz abd wall walk lateral redTB loop  7/10: wall clocks greenTB  Charges: TherEx x3 Total Timed Treatment: 38 min  Total  Treatment Time: 38 min

## 2024-08-21 RX ORDER — BLOOD SUGAR DIAGNOSTIC
STRIP MISCELLANEOUS
Qty: 300 STRIP | Refills: 3 | Status: SHIPPED | OUTPATIENT
Start: 2024-08-21

## 2024-09-12 NOTE — PROGRESS NOTES
HPI:     Jeanne Arnold is a 63 year old male with a PMH of HTN, HL, DM.    Following for:  1. Elevated PSA  2. H/o gross hematuria  - Cysto 9/18/23: moderate cystitis changes  - one episode ~ Mar 2023, lasted about a day. No prior episodes  3. BPH/LUTS    PCP - Rodrigo  LOV 9/18/23    Presents for check-up  He feels well. Appetite and energy are good.  No interim hematuria, dysuria.    AUA SS is 2/35 with 1 n, RAJEEV. Mostly happy with LUTS and declines meds for BPH.  Incontinence: PVD 1-2 x per week and not too bothersome  Penoscrotal LOV: none  ASIF: ~ 40 g prostate, no nodules or tenderness    UA is negative.    Good potency.    Prior PSAs:  - 3.10, 4K 9.6% 5/15/23  - 4.41 2/13/23  - < 2 y prior    CTU 6/21/23: [facility]    Reported ~ 30 oz water, 30 oz coffee, soda, tea, juice with medium to dark yellow urine. I encouraged the pt drink at least 40-60 oz water per day or enough to keep urine clear to pale yellow for UTI prevention.    UTI hx: none  Tobacco hx: none  Kidney stone hx: none  Fam h/o  malignancy: none    Discussed option to add proscar and reviewed SEs. He declines for now.    Discussed hematuria may have been 2/2 cystitis and/or BPH, encourage water intake for UTI prevention and consider proscar later if hematuria recurs (he declines for now). PSA check and f/u in 1 y with PSA prior.    HISTORY:  Past Medical History:    Abdominal pain, unspecified site    Acute gastritis without mention of hemorrhage    Screening for other and unspecified endocrine, nutritional, metabolic, and immunity disorders    Sprain of ankle, unspecified site    Sprain of wrist, unspecified site      No past surgical history on file.   Family History   Problem Relation Age of Onset    Heart Attack Father       Social History:   Social History     Socioeconomic History    Marital status:    Tobacco Use    Smoking status: Never    Smokeless tobacco: Never   Vaping Use    Vaping status: Never Used   Substance and Sexual Activity     Alcohol use: Yes     Alcohol/week: 0.0 standard drinks of alcohol     Comment: ONCE IN A WHILE.    Drug use: No   Other Topics Concern    Caffeine Concern Yes     Comment: 1-2 cups/day    Exercise No        Medications (Active prior to today's visit):  Current Outpatient Medications   Medication Sig Dispense Refill    Glucose Blood (ACCU-CHEK GUIDE) In Vitro Strip TEST 3 TIMES A DAY. 300 strip 3    METFORMIN HCL 1000 MG Oral Tab TAKE 1 TABLET BY MOUTH TWICE DAILY WITH MEALS 180 tablet 0    Propranolol HCl ER 60 MG Oral Capsule SR 24 Hr Take 1 capsule (60 mg total) by mouth daily. 90 capsule 3    empagliflozin 10 MG Oral Tab Take 1 tablet (10 mg total) by mouth daily. 90 tablet 1    atorvastatin 20 MG Oral Tab Take 1 tablet (20 mg total) by mouth nightly. 90 tablet 3    ACCU-CHEK FASTCLIX LANCETS Does not apply Misc Test once daily 100 each 3    Multiple Vitamins-Minerals (CENTRUM SILVER OR) Take  by mouth.         Allergies:  No Known Allergies      ROS:     A comprehensive 10 point review of systems was completed.  Pertinent positives and negatives noted in the the HPI.    PHYSICAL EXAM:     GENERAL APPEARANCE: well, developed, well nourished, in no acute distress  NEUROLOGIC: nonfocal, alert and oriented  HEAD: normocephalic, atraumatic  EYES: sclera non-icteric  EARS: hearing intact  ORAL CAVITY: mucosa moist  NECK/THYROID: no obvious goiter or masses  LUNGS: nonlabored breathing  ABDOMEN: soft, no obvious masses or tenderness  SKIN: no obvious rashes    : as noted above     ASSESSMENT/PLAN:   Diagnoses and all orders for this visit:    Gross hematuria    BPH with obstruction/lower urinary tract symptoms    Elevated PSA  -     PSA Total, Diagnostic; Future    Post-void dribbling  -     URINALYSIS, AUTO, W/O SCOPE    - as noted above.    Thanks again for this consult.    Rashid Shannon MD, FACS  Urologist  [facility]  Office: [phone]

## 2024-09-23 ENCOUNTER — LAB ENCOUNTER (OUTPATIENT)
Dept: LAB | Facility: HOSPITAL | Age: 64
End: 2024-09-23
Attending: UROLOGY
Payer: COMMERCIAL

## 2024-09-23 ENCOUNTER — OFFICE VISIT (OUTPATIENT)
Dept: SURGERY | Facility: CLINIC | Age: 64
End: 2024-09-23
Payer: COMMERCIAL

## 2024-09-23 DIAGNOSIS — R31.0 GROSS HEMATURIA: Primary | ICD-10-CM

## 2024-09-23 DIAGNOSIS — R97.20 ELEVATED PSA: ICD-10-CM

## 2024-09-23 DIAGNOSIS — N39.43 POST-VOID DRIBBLING: ICD-10-CM

## 2024-09-23 DIAGNOSIS — N40.1 BPH WITH OBSTRUCTION/LOWER URINARY TRACT SYMPTOMS: ICD-10-CM

## 2024-09-23 DIAGNOSIS — N13.8 BPH WITH OBSTRUCTION/LOWER URINARY TRACT SYMPTOMS: ICD-10-CM

## 2024-09-23 LAB
APPEARANCE: CLEAR
BILIRUBIN: NEGATIVE
GLUCOSE (URINE DIPSTICK): 100 MG/DL
KETONES (URINE DIPSTICK): NEGATIVE MG/DL
LEUKOCYTES: NEGATIVE
MULTISTIX LOT#: ABNORMAL NUMERIC
NITRITE, URINE: NEGATIVE
OCCULT BLOOD: NEGATIVE
PH, URINE: 6 (ref 4.5–8)
PROTEIN (URINE DIPSTICK): NEGATIVE MG/DL
PSA SERPL-MCNC: 2.49 NG/ML (ref ?–4)
SPECIFIC GRAVITY: 1.02 (ref 1–1.03)
URINE-COLOR: YELLOW
UROBILINOGEN,SEMI-QN: 0.2 MG/DL (ref 0–1.9)

## 2024-09-23 PROCEDURE — 36415 COLL VENOUS BLD VENIPUNCTURE: CPT

## 2024-09-23 PROCEDURE — 84153 ASSAY OF PSA TOTAL: CPT

## 2024-10-21 DIAGNOSIS — E11.9 TYPE 2 DIABETES MELLITUS WITHOUT COMPLICATION, WITHOUT LONG-TERM CURRENT USE OF INSULIN (HCC): ICD-10-CM

## 2024-10-21 NOTE — TELEPHONE ENCOUNTER
A refill request was received for:  Requested Prescriptions     Pending Prescriptions Disp Refills    METFORMIN HCL 1000 MG Oral Tab [Pharmacy Med Name: metFORMIN HCl 1000 MG Oral Tablet] 180 tablet 0     Sig: TAKE 1 TABLET BY MOUTH TWICE DAILY WITH MEALS       Last refill date:   7-22-24    Last office visit: 3-22-24    Follow up due:  Future Appointments   Date Time Provider Department Center   9/22/2025  4:30 PM Rashid Shannon MD [id number]

## 2024-11-13 DIAGNOSIS — E78.1 HYPERTRIGLYCERIDEMIA: ICD-10-CM

## 2024-11-13 DIAGNOSIS — E11.9 TYPE 2 DIABETES MELLITUS WITHOUT COMPLICATION, WITHOUT LONG-TERM CURRENT USE OF INSULIN (HCC): Primary | ICD-10-CM

## 2024-11-13 RX ORDER — ATORVASTATIN CALCIUM 20 MG/1
20 TABLET, FILM COATED ORAL NIGHTLY
Qty: 90 TABLET | Refills: 0 | Status: SHIPPED | OUTPATIENT
Start: 2024-11-13

## 2025-01-20 DIAGNOSIS — E11.9 TYPE 2 DIABETES MELLITUS WITHOUT COMPLICATION, WITHOUT LONG-TERM CURRENT USE OF INSULIN (HCC): ICD-10-CM

## 2025-01-24 DIAGNOSIS — E78.1 HYPERTRIGLYCERIDEMIA: ICD-10-CM

## 2025-01-24 RX ORDER — ATORVASTATIN CALCIUM 20 MG/1
20 TABLET, FILM COATED ORAL NIGHTLY
Qty: 90 TABLET | Refills: 0 | Status: SHIPPED | OUTPATIENT
Start: 2025-01-24

## 2025-02-01 ENCOUNTER — LAB ENCOUNTER (OUTPATIENT)
Dept: LAB | Age: 65
End: 2025-02-01
Attending: FAMILY MEDICINE
Payer: COMMERCIAL

## 2025-02-01 DIAGNOSIS — E78.1 HYPERTRIGLYCERIDEMIA: ICD-10-CM

## 2025-02-01 DIAGNOSIS — E11.9 TYPE 2 DIABETES MELLITUS WITHOUT COMPLICATION, WITHOUT LONG-TERM CURRENT USE OF INSULIN (HCC): ICD-10-CM

## 2025-02-01 LAB
ALBUMIN SERPL-MCNC: 4.7 G/DL (ref 3.2–4.8)
ALBUMIN/GLOB SERPL: 1.9 {RATIO} (ref 1–2)
ALP LIVER SERPL-CCNC: 89 U/L
ALT SERPL-CCNC: 22 U/L
ANION GAP SERPL CALC-SCNC: 6 MMOL/L (ref 0–18)
AST SERPL-CCNC: 20 U/L (ref ?–34)
BILIRUB SERPL-MCNC: 0.8 MG/DL (ref 0.2–1.1)
BUN BLD-MCNC: 16 MG/DL (ref 9–23)
CALCIUM BLD-MCNC: 9.4 MG/DL (ref 8.7–10.6)
CHLORIDE SERPL-SCNC: 105 MMOL/L (ref 98–112)
CHOLEST SERPL-MCNC: 99 MG/DL (ref ?–200)
CO2 SERPL-SCNC: 28 MMOL/L (ref 21–32)
CREAT BLD-MCNC: 1.34 MG/DL
EGFRCR SERPLBLD CKD-EPI 2021: 60 ML/MIN/1.73M2 (ref 60–?)
EST. AVERAGE GLUCOSE BLD GHB EST-MCNC: 166 MG/DL (ref 68–126)
FASTING PATIENT LIPID ANSWER: YES
FASTING STATUS PATIENT QL REPORTED: YES
GLOBULIN PLAS-MCNC: 2.5 G/DL (ref 2–3.5)
GLUCOSE BLD-MCNC: 174 MG/DL (ref 70–99)
HBA1C MFR BLD: 7.4 % (ref ?–5.7)
HDLC SERPL-MCNC: 32 MG/DL (ref 40–59)
LDLC SERPL CALC-MCNC: 38 MG/DL (ref ?–100)
NONHDLC SERPL-MCNC: 67 MG/DL (ref ?–130)
OSMOLALITY SERPL CALC.SUM OF ELEC: 293 MOSM/KG (ref 275–295)
POTASSIUM SERPL-SCNC: 4.8 MMOL/L (ref 3.5–5.1)
PROT SERPL-MCNC: 7.2 G/DL (ref 5.7–8.2)
SODIUM SERPL-SCNC: 139 MMOL/L (ref 136–145)
TRIGL SERPL-MCNC: 178 MG/DL (ref 30–149)
VLDLC SERPL CALC-MCNC: 24 MG/DL (ref 0–30)

## 2025-02-01 PROCEDURE — 36415 COLL VENOUS BLD VENIPUNCTURE: CPT

## 2025-02-01 PROCEDURE — 80053 COMPREHEN METABOLIC PANEL: CPT

## 2025-02-01 PROCEDURE — 83036 HEMOGLOBIN GLYCOSYLATED A1C: CPT

## 2025-02-01 PROCEDURE — 80061 LIPID PANEL: CPT

## 2025-02-07 ENCOUNTER — LAB ENCOUNTER (OUTPATIENT)
Dept: LAB | Age: 65
End: 2025-02-07
Attending: FAMILY MEDICINE
Payer: COMMERCIAL

## 2025-02-07 ENCOUNTER — OFFICE VISIT (OUTPATIENT)
Dept: FAMILY MEDICINE CLINIC | Facility: CLINIC | Age: 65
End: 2025-02-07
Payer: COMMERCIAL

## 2025-02-07 ENCOUNTER — NURSE ONLY (OUTPATIENT)
Dept: FAMILY MEDICINE CLINIC | Facility: CLINIC | Age: 65
End: 2025-02-07
Payer: COMMERCIAL

## 2025-02-07 VITALS
RESPIRATION RATE: 16 BRPM | HEART RATE: 61 BPM | SYSTOLIC BLOOD PRESSURE: 116 MMHG | HEIGHT: 69 IN | OXYGEN SATURATION: 98 % | DIASTOLIC BLOOD PRESSURE: 74 MMHG | BODY MASS INDEX: 25.48 KG/M2 | WEIGHT: 172 LBS

## 2025-02-07 DIAGNOSIS — E11.9 TYPE 2 DIABETES MELLITUS WITHOUT COMPLICATION, WITHOUT LONG-TERM CURRENT USE OF INSULIN (HCC): ICD-10-CM

## 2025-02-07 DIAGNOSIS — Z13.5 SCREENING FOR DIABETIC RETINOPATHY: ICD-10-CM

## 2025-02-07 DIAGNOSIS — Z00.00 WELLNESS EXAMINATION: Primary | ICD-10-CM

## 2025-02-07 DIAGNOSIS — Z00.00 WELLNESS EXAMINATION: ICD-10-CM

## 2025-02-07 LAB
CREAT UR-SCNC: 76.9 MG/DL
MICROALBUMIN UR-MCNC: 0.3 MG/DL
MICROALBUMIN/CREAT 24H UR-RTO: 3.9 UG/MG (ref ?–30)

## 2025-02-07 PROCEDURE — 82043 UR ALBUMIN QUANTITATIVE: CPT

## 2025-02-07 PROCEDURE — 82570 ASSAY OF URINE CREATININE: CPT

## 2025-02-07 PROCEDURE — 92229 IMG RTA DETC/MNTR DS POC ALY: CPT | Performed by: FAMILY MEDICINE

## 2025-02-07 PROCEDURE — 2033F EYE IMG VALID W/O RTNOPTHY: CPT | Performed by: FAMILY MEDICINE

## 2025-02-07 RX ORDER — DAPAGLIFLOZIN 5 MG/1
5 TABLET, FILM COATED ORAL DAILY
Qty: 90 TABLET | Refills: 3 | Status: SHIPPED | OUTPATIENT
Start: 2025-02-07 | End: 2026-02-02

## 2025-02-07 NOTE — PROGRESS NOTES
HPI:   Jeanne Arnold is a 64 year old male who presents for an Annual Health Visit.     Hasn't been able to take the farxiga.    Last  doctor visit. Was last month, but they don't do retinopathy    No major life changes.    , and 3 kids, 1 kid sometimes home, others are out, 1 mercedes had been staying with them but she is traveling a lot  Mother in law is staying with them    Works a lot, but doesn't find it too stressful.  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    Allergies:   No Known Allergies    CURRENT MEDICATIONS   Current Outpatient Medications   Medication Sig Dispense Refill    dapagliflozin (FARXIGA) 5 MG Oral Tab Take 1 tablet (5 mg total) by mouth daily. 90 tablet 3    metFORMIN HCl 1000 MG Oral Tab TAKE 1 TABLET BY MOUTH TWICE DAILY WITH MEALS 180 tablet 3    Glucose Blood (ACCU-CHEK GUIDE) In Vitro Strip TEST 3 TIMES A DAY. 300 strip 3    Propranolol HCl ER 60 MG Oral Capsule SR 24 Hr Take 1 capsule (60 mg total) by mouth daily. 90 capsule 3    ACCU-CHEK FASTCLIX LANCETS Does not apply Misc Test once daily 100 each 3    Multiple Vitamins-Minerals (CENTRUM SILVER OR) Take  by mouth.      ATORVASTATIN 20 MG Oral Tab TAKE 1 TABLET NIGHTLY (Patient not taking: Reported on 2/7/2025) 90 tablet 0      HISTORICAL INFORMATION   Past Medical History:    Abdominal pain, unspecified site    Acute gastritis without mention of hemorrhage    Screening for other and unspecified endocrine, nutritional, metabolic, and immunity disorders    Sprain of ankle, unspecified site    Sprain of wrist, unspecified site      History reviewed. No pertinent surgical history.   Family History   Problem Relation Age of Onset    Heart Attack Father       SOCIAL HISTORY   Social History     Socioeconomic History    Marital status:    Tobacco Use    Smoking status: Never    Smokeless tobacco: Never   Vaping Use    Vaping status: Never Used   Substance and Sexual Activity    Alcohol use: Yes      Alcohol/week: 0.0 standard drinks of alcohol     Comment: ONCE IN A WHILE.    Drug use: No   Other Topics Concern    Caffeine Concern Yes     Comment: 1-2 cups/day    Exercise No     Social History     Social History Narrative    Not on file        REVIEW OF SYSTEMS:     Constitutional: negative  Eyes: negative  ENT: negative  Respiratory: negative  Cardiovascular: negative  Gastrointestinal: negative  Integument/Breast: negative  Genitourinary: negative  Heme/Lymph: negative  Musculoskeletal:  had left shoulder pain and has improved.  Neurological: negative  Psych: negative  Endocrine: negative  Allergic/Immune: negative    EXAM:   /74   Pulse 61   Resp 16   Ht 5' 9\" (1.753 m)   Wt 172 lb (78 kg)   SpO2 98%   BMI 25.40 kg/m²    Wt Readings from Last 6 Encounters:   02/07/25 172 lb (78 kg)   07/23/24 180 lb (81.6 kg)   03/22/24 175 lb (79.4 kg)   02/06/24 176 lb (79.8 kg)   05/28/23 180 lb (81.6 kg)   01/16/23 178 lb (80.7 kg)     Body mass index is 25.4 kg/m².    General: alert, appears stated age, and cooperative  Head: Normocephalic, without obvious abnormality, atraumatic  Eyes: conjunctivae/corneas clear. PERRL, EOM's intact. Fundi benign.  Ears: normal TM's and external ear canals both ears  Nose: Nares normal. Septum midline. Mucosa normal. No drainage or sinus tenderness.  Throat: lips, mucosa, and tongue normal; teeth and gums normal  Neck: no adenopathy, no carotid bruit, no JVD, supple, symmetrical, trachea midline, and thyroid not enlarged, symmetric, no tenderness/mass/nodules  Heart: S1, S2 normal, no murmur, click, rub or gallop, regular rate and rhythm  Lungs: clear to auscultation bilaterally  Chest wall: no tenderness  Abdomen: soft, non-tender; bowel sounds normal; no masses,  no organomegaly  : deferred  Back: symmetric, no curvature. ROM normal. No CVA tenderness.  Extremities: extremities normal, atraumatic, no cyanosis or edema  Pulses: 2+ and symmetric  Skin: Skin color,  texture, turgor normal. No rashes or lesions  Lymph Nodes: Cervical, supraclavicular, and axillary nodes normal.  Neurologic: Grossly normal  Bilateral barefoot skin diabetic exam is normal, visualized feet and the appearance is normal.  Bilateral monofilament/sensation of both feet is normal.  Pulsation pedal pulse exam of both lower legs/feet is normal as well.      ASSESSMENT AND PLAN:   Vi was seen today for physical.    Diagnoses and all orders for this visit:    Wellness examination  -     Microalb/Creat Ratio, Random Urine; Future    Type 2 diabetes mellitus without complication, without long-term current use of insulin (HCC)  -     Microalb/Creat Ratio, Random Urine; Future  -     dapagliflozin (FARXIGA) 5 MG Oral Tab; Take 1 tablet (5 mg total) by mouth daily.    Screening for diabetic retinopathy  -     Diabetic Retinopathy Exam  OU - Both Eyes; Future        There are no Patient Instructions on file for this visit.    The patient indicates understanding of these issues and agrees to the plan.    Problem List:  Patient Active Problem List   Diagnosis    Esophageal reflux    Essential and other specified forms of tremor    Other chronic nonalcoholic liver disease    Type 2 diabetes mellitus without complication (HCC)    History of adenomatous polyp of colon    Benign neoplasm of transverse colon    Benign neoplasm of sigmoid colon       Austin Wallace MD  2/7/2025  8:06 AM

## 2025-04-21 RX ORDER — PROPRANOLOL HYDROCHLORIDE 60 MG/1
1 CAPSULE, EXTENDED RELEASE ORAL DAILY
Qty: 90 CAPSULE | Refills: 3 | OUTPATIENT
Start: 2025-04-21

## 2025-04-28 DIAGNOSIS — E11.9 TYPE 2 DIABETES MELLITUS WITHOUT COMPLICATION, WITHOUT LONG-TERM CURRENT USE OF INSULIN (HCC): ICD-10-CM

## 2025-05-02 ENCOUNTER — TELEPHONE (OUTPATIENT)
Dept: FAMILY MEDICINE CLINIC | Facility: CLINIC | Age: 65
End: 2025-05-02

## 2025-05-02 RX ORDER — PROPRANOLOL HYDROCHLORIDE 60 MG/1
60 CAPSULE, EXTENDED RELEASE ORAL DAILY
Qty: 90 CAPSULE | Refills: 3 | Status: SHIPPED | OUTPATIENT
Start: 2025-05-02

## (undated) DIAGNOSIS — E11.9 TYPE 2 DIABETES MELLITUS WITHOUT COMPLICATION, WITHOUT LONG-TERM CURRENT USE OF INSULIN (HCC): ICD-10-CM

## (undated) DIAGNOSIS — G25.0 ESSENTIAL TREMOR: ICD-10-CM

## (undated) DIAGNOSIS — E78.1 HYPERTRIGLYCERIDEMIA: ICD-10-CM

## (undated) NOTE — LETTER
09/11/19        Vi C La  [address]      Dear Vi,    Our records indicate that you have outstanding lab work and or testing that was ordered for you and has not yet been completed:  Orders Placed This Encounter      PAYTON W Dif

## (undated) NOTE — LETTER
09/12/19        Vi C La  [address]      Dear Vi,    Our records indicate that you have outstanding lab work and or testing that was ordered for you and has not yet been completed:  Orders Placed This Encounter      PAYTON W Dif

## (undated) NOTE — MR AVS SNAPSHOT
[address]  [address] [address] [phone]               Thank you for choosing us for your health care visit with Ava Romano DO.   We are glad to serve you and happy to provide you with this Take  by mouth. Fenofibrate 48 MG Tabs   Take 1 tablet (48 mg total) by mouth daily.    Commonly known as:  TRICOR           Glucose Blood Strp   Test once daily fasting in am   Commonly known as:  TOR CONTOUR NEXT TEST           MetFORMIN HCl 1 are inactive.      HOW TO GET STARTED: HOW TO STAY MOTIVATED:   Start activities slowly and build up over time Do what you like   Get your heart pumping – brisk walking, biking, swimming Even 10 minute increments are effective and add up over the week   2 ½